# Patient Record
Sex: FEMALE | Race: WHITE | NOT HISPANIC OR LATINO
[De-identification: names, ages, dates, MRNs, and addresses within clinical notes are randomized per-mention and may not be internally consistent; named-entity substitution may affect disease eponyms.]

---

## 2017-11-28 ENCOUNTER — APPOINTMENT (OUTPATIENT)
Dept: OPHTHALMOLOGY | Facility: CLINIC | Age: 82
End: 2017-11-28

## 2018-10-15 ENCOUNTER — OUTPATIENT (OUTPATIENT)
Dept: OUTPATIENT SERVICES | Facility: HOSPITAL | Age: 83
LOS: 1 days | End: 2018-10-15
Payer: MEDICARE

## 2018-10-15 ENCOUNTER — APPOINTMENT (OUTPATIENT)
Dept: CT IMAGING | Facility: CLINIC | Age: 83
End: 2018-10-15
Payer: MEDICARE

## 2018-10-15 DIAGNOSIS — Z00.8 ENCOUNTER FOR OTHER GENERAL EXAMINATION: ICD-10-CM

## 2018-10-15 PROCEDURE — 70450 CT HEAD/BRAIN W/O DYE: CPT | Mod: 26

## 2018-10-15 PROCEDURE — 70450 CT HEAD/BRAIN W/O DYE: CPT

## 2018-10-19 ENCOUNTER — APPOINTMENT (OUTPATIENT)
Dept: NEUROLOGY | Facility: CLINIC | Age: 83
End: 2018-10-19
Payer: MEDICARE

## 2018-10-19 PROCEDURE — 95819 EEG AWAKE AND ASLEEP: CPT

## 2018-10-23 ENCOUNTER — OTHER (OUTPATIENT)
Age: 83
End: 2018-10-23

## 2018-12-17 ENCOUNTER — TRANSCRIPTION ENCOUNTER (OUTPATIENT)
Age: 83
End: 2018-12-17

## 2018-12-17 ENCOUNTER — INPATIENT (INPATIENT)
Facility: HOSPITAL | Age: 83
LOS: 10 days | Discharge: NURSING, REHAB OR LONG-TERM CARE | End: 2018-12-28
Attending: ORTHOPAEDIC SURGERY | Admitting: ORTHOPAEDIC SURGERY
Payer: MEDICARE

## 2018-12-17 VITALS
RESPIRATION RATE: 16 BRPM | SYSTOLIC BLOOD PRESSURE: 97 MMHG | HEART RATE: 109 BPM | TEMPERATURE: 98 F | DIASTOLIC BLOOD PRESSURE: 62 MMHG | OXYGEN SATURATION: 93 %

## 2018-12-17 PROCEDURE — 73552 X-RAY EXAM OF FEMUR 2/>: CPT | Mod: 26,RT

## 2018-12-17 PROCEDURE — 71045 X-RAY EXAM CHEST 1 VIEW: CPT | Mod: 26

## 2018-12-17 PROCEDURE — 73502 X-RAY EXAM HIP UNI 2-3 VIEWS: CPT | Mod: 26,RT

## 2018-12-17 RX ORDER — MORPHINE SULFATE 50 MG/1
1 CAPSULE, EXTENDED RELEASE ORAL ONCE
Qty: 0 | Refills: 0 | Status: DISCONTINUED | OUTPATIENT
Start: 2018-12-17 | End: 2018-12-17

## 2018-12-17 NOTE — ED ADULT TRIAGE NOTE - CHIEF COMPLAINT QUOTE
Pt brought in from [facility] for confirmed right hip fx. Pt with hx of dementia, had unwitnessed fall and after xray confirmed right hip fx. Pt appears uncomfortable in triage, vs as noted

## 2018-12-18 DIAGNOSIS — F03.90 UNSPECIFIED DEMENTIA WITHOUT BEHAVIORAL DISTURBANCE: ICD-10-CM

## 2018-12-18 DIAGNOSIS — I48.2 CHRONIC ATRIAL FIBRILLATION: ICD-10-CM

## 2018-12-18 DIAGNOSIS — S72.001A FRACTURE OF UNSPECIFIED PART OF NECK OF RIGHT FEMUR, INITIAL ENCOUNTER FOR CLOSED FRACTURE: ICD-10-CM

## 2018-12-18 DIAGNOSIS — I34.1 NONRHEUMATIC MITRAL (VALVE) PROLAPSE: ICD-10-CM

## 2018-12-18 DIAGNOSIS — I48.1 PERSISTENT ATRIAL FIBRILLATION: ICD-10-CM

## 2018-12-18 DIAGNOSIS — Z01.818 ENCOUNTER FOR OTHER PREPROCEDURAL EXAMINATION: ICD-10-CM

## 2018-12-18 DIAGNOSIS — S72.144A NONDISPLACED INTERTROCHANTERIC FRACTURE OF RIGHT FEMUR, INITIAL ENCOUNTER FOR CLOSED FRACTURE: ICD-10-CM

## 2018-12-18 LAB
24R-OH-CALCIDIOL SERPL-MCNC: 29.6 NG/ML — LOW (ref 30–80)
ALBUMIN SERPL ELPH-MCNC: 3.5 G/DL — SIGNIFICANT CHANGE UP (ref 3.3–5)
ALBUMIN SERPL ELPH-MCNC: 3.7 G/DL — SIGNIFICANT CHANGE UP (ref 3.3–5)
ALP SERPL-CCNC: 72 U/L — SIGNIFICANT CHANGE UP (ref 40–120)
ALT FLD-CCNC: 18 U/L — SIGNIFICANT CHANGE UP (ref 4–33)
APPEARANCE UR: CLEAR — SIGNIFICANT CHANGE UP
APTT BLD: 29.4 SEC — SIGNIFICANT CHANGE UP (ref 27.5–36.3)
AST SERPL-CCNC: 43 U/L — HIGH (ref 4–32)
BACTERIA # UR AUTO: NEGATIVE — SIGNIFICANT CHANGE UP
BASOPHILS # BLD AUTO: 0.06 K/UL — SIGNIFICANT CHANGE UP (ref 0–0.2)
BASOPHILS NFR BLD AUTO: 0.4 % — SIGNIFICANT CHANGE UP (ref 0–2)
BILIRUB SERPL-MCNC: 0.9 MG/DL — SIGNIFICANT CHANGE UP (ref 0.2–1.2)
BILIRUB UR-MCNC: NEGATIVE — SIGNIFICANT CHANGE UP
BLD GP AB SCN SERPL QL: NEGATIVE — SIGNIFICANT CHANGE UP
BLD GP AB SCN SERPL QL: NEGATIVE — SIGNIFICANT CHANGE UP
BLOOD UR QL VISUAL: NEGATIVE — SIGNIFICANT CHANGE UP
BUN SERPL-MCNC: 25 MG/DL — HIGH (ref 7–23)
BUN SERPL-MCNC: 26 MG/DL — HIGH (ref 7–23)
BUN SERPL-MCNC: 27 MG/DL — HIGH (ref 7–23)
CALCIUM SERPL-MCNC: 8.2 MG/DL — LOW (ref 8.4–10.5)
CALCIUM SERPL-MCNC: 9.2 MG/DL — SIGNIFICANT CHANGE UP (ref 8.4–10.5)
CALCIUM SERPL-MCNC: 9.2 MG/DL — SIGNIFICANT CHANGE UP (ref 8.4–10.5)
CHLORIDE SERPL-SCNC: 101 MMOL/L — SIGNIFICANT CHANGE UP (ref 98–107)
CHLORIDE SERPL-SCNC: 106 MMOL/L — SIGNIFICANT CHANGE UP (ref 98–107)
CHLORIDE SERPL-SCNC: 99 MMOL/L — SIGNIFICANT CHANGE UP (ref 98–107)
CO2 SERPL-SCNC: 25 MMOL/L — SIGNIFICANT CHANGE UP (ref 22–31)
CO2 SERPL-SCNC: 25 MMOL/L — SIGNIFICANT CHANGE UP (ref 22–31)
CO2 SERPL-SCNC: 26 MMOL/L — SIGNIFICANT CHANGE UP (ref 22–31)
COLOR SPEC: YELLOW — SIGNIFICANT CHANGE UP
CREAT SERPL-MCNC: 0.75 MG/DL — SIGNIFICANT CHANGE UP (ref 0.5–1.3)
CREAT SERPL-MCNC: 0.81 MG/DL — SIGNIFICANT CHANGE UP (ref 0.5–1.3)
CREAT SERPL-MCNC: 0.91 MG/DL — SIGNIFICANT CHANGE UP (ref 0.5–1.3)
EOSINOPHIL # BLD AUTO: 0.13 K/UL — SIGNIFICANT CHANGE UP (ref 0–0.5)
EOSINOPHIL NFR BLD AUTO: 1 % — SIGNIFICANT CHANGE UP (ref 0–6)
GLUCOSE SERPL-MCNC: 115 MG/DL — HIGH (ref 70–99)
GLUCOSE SERPL-MCNC: 136 MG/DL — HIGH (ref 70–99)
GLUCOSE SERPL-MCNC: 142 MG/DL — HIGH (ref 70–99)
GLUCOSE UR-MCNC: NEGATIVE — SIGNIFICANT CHANGE UP
HCT VFR BLD CALC: 35.1 % — SIGNIFICANT CHANGE UP (ref 34.5–45)
HCT VFR BLD CALC: 41.1 % — SIGNIFICANT CHANGE UP (ref 34.5–45)
HCT VFR BLD CALC: 42.2 % — SIGNIFICANT CHANGE UP (ref 34.5–45)
HGB BLD-MCNC: 10.9 G/DL — LOW (ref 11.5–15.5)
HGB BLD-MCNC: 13.1 G/DL — SIGNIFICANT CHANGE UP (ref 11.5–15.5)
HGB BLD-MCNC: 13.4 G/DL — SIGNIFICANT CHANGE UP (ref 11.5–15.5)
HYALINE CASTS # UR AUTO: NEGATIVE — SIGNIFICANT CHANGE UP
IMM GRANULOCYTES # BLD AUTO: 0.05 # — SIGNIFICANT CHANGE UP
IMM GRANULOCYTES NFR BLD AUTO: 0.4 % — SIGNIFICANT CHANGE UP (ref 0–1.5)
INR BLD: 1.36 — HIGH (ref 0.88–1.17)
KETONES UR-MCNC: NEGATIVE — SIGNIFICANT CHANGE UP
LEUKOCYTE ESTERASE UR-ACNC: SIGNIFICANT CHANGE UP
LYMPHOCYTES # BLD AUTO: 1.79 K/UL — SIGNIFICANT CHANGE UP (ref 1–3.3)
LYMPHOCYTES # BLD AUTO: 13.2 % — SIGNIFICANT CHANGE UP (ref 13–44)
MCHC RBC-ENTMCNC: 29.5 PG — SIGNIFICANT CHANGE UP (ref 27–34)
MCHC RBC-ENTMCNC: 29.6 PG — SIGNIFICANT CHANGE UP (ref 27–34)
MCHC RBC-ENTMCNC: 29.7 PG — SIGNIFICANT CHANGE UP (ref 27–34)
MCHC RBC-ENTMCNC: 31.1 % — LOW (ref 32–36)
MCHC RBC-ENTMCNC: 31.8 % — LOW (ref 32–36)
MCHC RBC-ENTMCNC: 31.9 % — LOW (ref 32–36)
MCV RBC AUTO: 93.2 FL — SIGNIFICANT CHANGE UP (ref 80–100)
MCV RBC AUTO: 93.4 FL — SIGNIFICANT CHANGE UP (ref 80–100)
MCV RBC AUTO: 95.1 FL — SIGNIFICANT CHANGE UP (ref 80–100)
MONOCYTES # BLD AUTO: 0.62 K/UL — SIGNIFICANT CHANGE UP (ref 0–0.9)
MONOCYTES NFR BLD AUTO: 4.6 % — SIGNIFICANT CHANGE UP (ref 2–14)
NEUTROPHILS # BLD AUTO: 10.87 K/UL — HIGH (ref 1.8–7.4)
NEUTROPHILS NFR BLD AUTO: 80.4 % — HIGH (ref 43–77)
NITRITE UR-MCNC: NEGATIVE — SIGNIFICANT CHANGE UP
NRBC # FLD: 0 — SIGNIFICANT CHANGE UP
PH UR: 6 — SIGNIFICANT CHANGE UP (ref 5–8)
PLATELET # BLD AUTO: 126 K/UL — LOW (ref 150–400)
PLATELET # BLD AUTO: 153 K/UL — SIGNIFICANT CHANGE UP (ref 150–400)
PLATELET # BLD AUTO: 159 K/UL — SIGNIFICANT CHANGE UP (ref 150–400)
PMV BLD: 12.6 FL — SIGNIFICANT CHANGE UP (ref 7–13)
PMV BLD: 12.6 FL — SIGNIFICANT CHANGE UP (ref 7–13)
PMV BLD: 13 FL — SIGNIFICANT CHANGE UP (ref 7–13)
POTASSIUM SERPL-MCNC: 4.1 MMOL/L — SIGNIFICANT CHANGE UP (ref 3.5–5.3)
POTASSIUM SERPL-MCNC: 4.2 MMOL/L — SIGNIFICANT CHANGE UP (ref 3.5–5.3)
POTASSIUM SERPL-MCNC: 4.9 MMOL/L — SIGNIFICANT CHANGE UP (ref 3.5–5.3)
POTASSIUM SERPL-SCNC: 4.1 MMOL/L — SIGNIFICANT CHANGE UP (ref 3.5–5.3)
POTASSIUM SERPL-SCNC: 4.2 MMOL/L — SIGNIFICANT CHANGE UP (ref 3.5–5.3)
POTASSIUM SERPL-SCNC: 4.9 MMOL/L — SIGNIFICANT CHANGE UP (ref 3.5–5.3)
PROT SERPL-MCNC: 7 G/DL — SIGNIFICANT CHANGE UP (ref 6–8.3)
PROT UR-MCNC: 20 — SIGNIFICANT CHANGE UP
PROTHROM AB SERPL-ACNC: 15.2 SEC — HIGH (ref 9.8–13.1)
RBC # BLD: 3.69 M/UL — LOW (ref 3.8–5.2)
RBC # BLD: 4.41 M/UL — SIGNIFICANT CHANGE UP (ref 3.8–5.2)
RBC # BLD: 4.52 M/UL — SIGNIFICANT CHANGE UP (ref 3.8–5.2)
RBC # FLD: 14.2 % — SIGNIFICANT CHANGE UP (ref 10.3–14.5)
RBC # FLD: 14.3 % — SIGNIFICANT CHANGE UP (ref 10.3–14.5)
RBC # FLD: 14.4 % — SIGNIFICANT CHANGE UP (ref 10.3–14.5)
RBC CASTS # UR COMP ASSIST: SIGNIFICANT CHANGE UP (ref 0–?)
RH IG SCN BLD-IMP: POSITIVE — SIGNIFICANT CHANGE UP
RH IG SCN BLD-IMP: POSITIVE — SIGNIFICANT CHANGE UP
SODIUM SERPL-SCNC: 140 MMOL/L — SIGNIFICANT CHANGE UP (ref 135–145)
SODIUM SERPL-SCNC: 142 MMOL/L — SIGNIFICANT CHANGE UP (ref 135–145)
SODIUM SERPL-SCNC: 145 MMOL/L — SIGNIFICANT CHANGE UP (ref 135–145)
SP GR SPEC: 1.02 — SIGNIFICANT CHANGE UP (ref 1–1.04)
SQUAMOUS # UR AUTO: SIGNIFICANT CHANGE UP
TROPONIN T, HIGH SENSITIVITY: 13 NG/L — SIGNIFICANT CHANGE UP (ref ?–14)
TROPONIN T, HIGH SENSITIVITY: 14 NG/L — SIGNIFICANT CHANGE UP (ref ?–14)
UROBILINOGEN FLD QL: NORMAL — SIGNIFICANT CHANGE UP
WBC # BLD: 13.52 K/UL — HIGH (ref 3.8–10.5)
WBC # BLD: 13.83 K/UL — HIGH (ref 3.8–10.5)
WBC # BLD: 14.38 K/UL — HIGH (ref 3.8–10.5)
WBC # FLD AUTO: 13.52 K/UL — HIGH (ref 3.8–10.5)
WBC # FLD AUTO: 13.83 K/UL — HIGH (ref 3.8–10.5)
WBC # FLD AUTO: 14.38 K/UL — HIGH (ref 3.8–10.5)
WBC UR QL: HIGH (ref 0–?)

## 2018-12-18 PROCEDURE — 99223 1ST HOSP IP/OBS HIGH 75: CPT

## 2018-12-18 PROCEDURE — 93279 PRGRMG DEV EVAL PM/LDLS PM: CPT | Mod: 26

## 2018-12-18 PROCEDURE — 93306 TTE W/DOPPLER COMPLETE: CPT | Mod: 26

## 2018-12-18 PROCEDURE — 12345: CPT | Mod: NC

## 2018-12-18 PROCEDURE — 27245 TREAT THIGH FRACTURE: CPT | Mod: RT

## 2018-12-18 PROCEDURE — 73080 X-RAY EXAM OF ELBOW: CPT | Mod: 26,RT

## 2018-12-18 PROCEDURE — 70450 CT HEAD/BRAIN W/O DYE: CPT | Mod: 26

## 2018-12-18 PROCEDURE — 72125 CT NECK SPINE W/O DYE: CPT | Mod: 26

## 2018-12-18 RX ORDER — MIRTAZAPINE 45 MG/1
1 TABLET, ORALLY DISINTEGRATING ORAL
Qty: 0 | Refills: 0 | COMMUNITY

## 2018-12-18 RX ORDER — RIVAROXABAN 15 MG-20MG
10 KIT ORAL EVERY 24 HOURS
Qty: 0 | Refills: 0 | Status: DISCONTINUED | OUTPATIENT
Start: 2018-12-19 | End: 2018-12-27

## 2018-12-18 RX ORDER — LEVETIRACETAM 250 MG/1
1 TABLET, FILM COATED ORAL
Qty: 0 | Refills: 0 | COMMUNITY

## 2018-12-18 RX ORDER — SENNA PLUS 8.6 MG/1
2 TABLET ORAL AT BEDTIME
Qty: 0 | Refills: 0 | Status: DISCONTINUED | OUTPATIENT
Start: 2018-12-18 | End: 2018-12-26

## 2018-12-18 RX ORDER — MORPHINE SULFATE 50 MG/1
1 CAPSULE, EXTENDED RELEASE ORAL
Qty: 0 | Refills: 0 | Status: DISCONTINUED | OUTPATIENT
Start: 2018-12-18 | End: 2018-12-22

## 2018-12-18 RX ORDER — CEFAZOLIN SODIUM 1 G
2000 VIAL (EA) INJECTION EVERY 8 HOURS
Qty: 0 | Refills: 0 | Status: COMPLETED | OUTPATIENT
Start: 2018-12-19 | End: 2018-12-19

## 2018-12-18 RX ORDER — OXYCODONE HYDROCHLORIDE 5 MG/1
5 TABLET ORAL EVERY 4 HOURS
Qty: 0 | Refills: 0 | Status: DISCONTINUED | OUTPATIENT
Start: 2018-12-18 | End: 2018-12-22

## 2018-12-18 RX ORDER — DILTIAZEM HCL 120 MG
120 CAPSULE, EXT RELEASE 24 HR ORAL DAILY
Qty: 0 | Refills: 0 | Status: DISCONTINUED | OUTPATIENT
Start: 2018-12-18 | End: 2018-12-19

## 2018-12-18 RX ORDER — CARVEDILOL PHOSPHATE 80 MG/1
12.5 CAPSULE, EXTENDED RELEASE ORAL EVERY 12 HOURS
Qty: 0 | Refills: 0 | Status: DISCONTINUED | OUTPATIENT
Start: 2018-12-18 | End: 2018-12-19

## 2018-12-18 RX ORDER — OXYCODONE HYDROCHLORIDE 5 MG/1
2.5 TABLET ORAL EVERY 4 HOURS
Qty: 0 | Refills: 0 | Status: DISCONTINUED | OUTPATIENT
Start: 2018-12-18 | End: 2018-12-22

## 2018-12-18 RX ORDER — ACETAMINOPHEN 500 MG
650 TABLET ORAL EVERY 8 HOURS
Qty: 0 | Refills: 0 | Status: DISCONTINUED | OUTPATIENT
Start: 2018-12-18 | End: 2018-12-25

## 2018-12-18 RX ORDER — LEVETIRACETAM 250 MG/1
500 TABLET, FILM COATED ORAL
Qty: 0 | Refills: 0 | Status: DISCONTINUED | OUTPATIENT
Start: 2018-12-18 | End: 2018-12-19

## 2018-12-18 RX ORDER — MIRTAZAPINE 45 MG/1
7.5 TABLET, ORALLY DISINTEGRATING ORAL AT BEDTIME
Qty: 0 | Refills: 0 | Status: DISCONTINUED | OUTPATIENT
Start: 2018-12-18 | End: 2018-12-19

## 2018-12-18 RX ORDER — LATANOPROST 0.05 MG/ML
1 SOLUTION/ DROPS OPHTHALMIC; TOPICAL AT BEDTIME
Qty: 0 | Refills: 0 | Status: DISCONTINUED | OUTPATIENT
Start: 2018-12-18 | End: 2018-12-28

## 2018-12-18 RX ORDER — SENNA PLUS 8.6 MG/1
2 TABLET ORAL
Qty: 0 | Refills: 0 | COMMUNITY

## 2018-12-18 RX ORDER — SODIUM CHLORIDE 9 MG/ML
1000 INJECTION INTRAMUSCULAR; INTRAVENOUS; SUBCUTANEOUS
Qty: 0 | Refills: 0 | Status: DISCONTINUED | OUTPATIENT
Start: 2018-12-18 | End: 2018-12-18

## 2018-12-18 RX ORDER — SODIUM CHLORIDE 9 MG/ML
500 INJECTION, SOLUTION INTRAVENOUS ONCE
Qty: 0 | Refills: 0 | Status: COMPLETED | OUTPATIENT
Start: 2018-12-18 | End: 2018-12-18

## 2018-12-18 RX ORDER — SODIUM CHLORIDE 9 MG/ML
1000 INJECTION INTRAMUSCULAR; INTRAVENOUS; SUBCUTANEOUS
Qty: 0 | Refills: 0 | Status: DISCONTINUED | OUTPATIENT
Start: 2018-12-18 | End: 2018-12-19

## 2018-12-18 RX ORDER — DOCUSATE SODIUM 100 MG
300 CAPSULE ORAL DAILY
Qty: 0 | Refills: 0 | Status: DISCONTINUED | OUTPATIENT
Start: 2018-12-18 | End: 2018-12-26

## 2018-12-18 RX ORDER — DOCUSATE SODIUM 100 MG
3 CAPSULE ORAL
Qty: 0 | Refills: 0 | COMMUNITY

## 2018-12-18 RX ADMIN — LEVETIRACETAM 500 MILLIGRAM(S): 250 TABLET, FILM COATED ORAL at 21:25

## 2018-12-18 RX ADMIN — SODIUM CHLORIDE 100 MILLILITER(S): 9 INJECTION INTRAMUSCULAR; INTRAVENOUS; SUBCUTANEOUS at 22:34

## 2018-12-18 RX ADMIN — LATANOPROST 1 DROP(S): 0.05 SOLUTION/ DROPS OPHTHALMIC; TOPICAL at 21:25

## 2018-12-18 RX ADMIN — MORPHINE SULFATE 1 MILLIGRAM(S): 50 CAPSULE, EXTENDED RELEASE ORAL at 09:18

## 2018-12-18 RX ADMIN — SODIUM CHLORIDE 100 MILLILITER(S): 9 INJECTION INTRAMUSCULAR; INTRAVENOUS; SUBCUTANEOUS at 20:00

## 2018-12-18 RX ADMIN — MIRTAZAPINE 7.5 MILLIGRAM(S): 45 TABLET, ORALLY DISINTEGRATING ORAL at 21:25

## 2018-12-18 RX ADMIN — OXYCODONE HYDROCHLORIDE 2.5 MILLIGRAM(S): 5 TABLET ORAL at 06:03

## 2018-12-18 RX ADMIN — Medication 120 MILLIGRAM(S): at 06:02

## 2018-12-18 RX ADMIN — Medication 300 MILLIGRAM(S): at 13:54

## 2018-12-18 RX ADMIN — LEVETIRACETAM 500 MILLIGRAM(S): 250 TABLET, FILM COATED ORAL at 06:03

## 2018-12-18 RX ADMIN — CARVEDILOL PHOSPHATE 12.5 MILLIGRAM(S): 80 CAPSULE, EXTENDED RELEASE ORAL at 06:03

## 2018-12-18 RX ADMIN — OXYCODONE HYDROCHLORIDE 2.5 MILLIGRAM(S): 5 TABLET ORAL at 09:18

## 2018-12-18 RX ADMIN — SODIUM CHLORIDE 125 MILLILITER(S): 9 INJECTION INTRAMUSCULAR; INTRAVENOUS; SUBCUTANEOUS at 06:04

## 2018-12-18 RX ADMIN — MORPHINE SULFATE 1 MILLIGRAM(S): 50 CAPSULE, EXTENDED RELEASE ORAL at 01:50

## 2018-12-18 RX ADMIN — SENNA PLUS 2 TABLET(S): 8.6 TABLET ORAL at 21:25

## 2018-12-18 RX ADMIN — SODIUM CHLORIDE 1000 MILLILITER(S): 9 INJECTION, SOLUTION INTRAVENOUS at 19:05

## 2018-12-18 NOTE — PROGRESS NOTE ADULT - SUBJECTIVE AND OBJECTIVE BOX
ORTHO ATTENDING POST OP    S/P IM FIXATION R   HIP  WBAT R   LE  restart xarelto tomorrow  venodynes  ancef 1 g x 24 h  OOB to chair in AM  rehab consult   f/u medicine  CBC in RR and AM

## 2018-12-18 NOTE — BRIEF OPERATIVE NOTE - PROCEDURE
<<-----Click on this checkbox to enter Procedure Intramedullary insertion of intertrochanteric antegrade nail into hip  12/18/2018    Active  PORTIA

## 2018-12-18 NOTE — CONSULT NOTE ADULT - SUBJECTIVE AND OBJECTIVE BOX
CHIEF COMPLAINT:Patient is a 89y old  Female who presents with a chief complaint of R hip pain (18 Dec 2018 05:43)      HISTORY OF PRESENT ILLNESS:    89 female with history as below admitted with R hip fracture  Due to altered mental status, subjective information were not able to be obtained from the patient. History was obtained, to the extent possible, from review of the chart and collateral sources of information.     PAST MEDICAL & SURGICAL HISTORY:  Varicose Veins of Lower Extremities  MVP (Mitral Valve Prolapse)  Atrial Fibrillation  Hyperlipidemia  IBS (Irritable Bowel Syndrome)  Abdominal Adhesions: removal  S/P Tonsillectomy          MEDICATIONS:  carvedilol 12.5 milliGRAM(s) Oral every 12 hours  diltiazem    milliGRAM(s) Oral daily        acetaminophen   Tablet .. 650 milliGRAM(s) Oral every 8 hours PRN  levETIRAcetam 500 milliGRAM(s) Oral two times a day  mirtazapine 7.5 milliGRAM(s) Oral at bedtime  morphine  - Injectable 1 milliGRAM(s) IV Push every 2 hours PRN  oxyCODONE    IR 2.5 milliGRAM(s) Oral every 4 hours PRN  oxyCODONE    IR 5 milliGRAM(s) Oral every 4 hours PRN    docusate sodium 300 milliGRAM(s) Oral daily  senna 2 Tablet(s) Oral at bedtime      latanoprost 0.005% Ophthalmic Solution 1 Drop(s) Both EYES at bedtime  sodium chloride 0.9%. 1000 milliLiter(s) IV Continuous <Continuous>      FAMILY HISTORY:      Non-contributory    SOCIAL HISTORY:    No tobacco, drugs or etoh    Allergies    No Known Allergies    Intolerances    	    REVIEW OF SYSTEMS:  as above  The rest of the 14 points ROS reviewed and except above they are unremarkable.        PHYSICAL EXAM:  T(C): 36.7 (12-18-18 @ 04:00), Max: 36.7 (12-18-18 @ 04:00)  HR: 77 (12-18-18 @ 04:00) (77 - 109)  BP: 128/67 (12-18-18 @ 04:00) (97/62 - 135/71)  RR: 16 (12-18-18 @ 04:00) (16 - 16)  SpO2: 96% (12-18-18 @ 04:00) (93% - 98%)  Wt(kg): --  I&O's Summary    JVP: Normal  Neck: supple  Lung: clear   CV: S1 S2 , Murmur: pos salma   Abd: soft  Ext: No edema  neuro: Awake / alert  Psych: flat affect  Skin: normal      LABS/DATA:    TELEMETRY: 	    ECG:  	afib, anterolateral st wave abn consistent with ischemia    	  CARDIAC MARKERS:                                      13.4   13.83 )-----------( 153      ( 18 Dec 2018 05:10 )             42.2     12-18    142  |  99  |  27<H>  ----------------------------<  115<H>  4.1   |  25  |  0.91    Ca    9.2      18 Dec 2018 05:10    TPro  x   /  Alb  3.7  /  TBili  x   /  DBili  x   /  AST  x   /  ALT  x   /  AlkPhos  x   12-18    proBNP:   Lipid Profile:   HgA1c:   TSH:

## 2018-12-18 NOTE — CONSULT NOTE ADULT - ASSESSMENT
89 yo F with h/o A fib on xarelto,  MVP, HLD sent from nursing home for R hip pain and R hip intertrochanteric fx.

## 2018-12-18 NOTE — PROGRESS NOTE ADULT - PROBLEM SELECTOR PLAN 1
Ortho planning for ORIF repair.  Awaiting cardiac risk stratification and management by the cardiologist.  Pain control with morphine IV PRN. On Xarelto at home - will monitor for post-op anemia.

## 2018-12-18 NOTE — ED ADULT NURSE NOTE - CHIEF COMPLAINT QUOTE
Pt brought in from [facility] for confirmed right hip fx. Pt with hx of dementia, had unwitnessed fall and after xray confirmed right hip fx. Pt appears uncomfortable in triage, vs as noted

## 2018-12-18 NOTE — CONSULT NOTE ADULT - PROBLEM SELECTOR RECOMMENDATION 2
- Unable to assess pt's exercise tolerance and there is uncertainty regarding circumstances of her fall and whether there was any LOC or possible cardiac related event as this was unwitnessed  - Monitor on telemetry as pt may have had syncopal episode and has h/o arrhythmia  - Check troponin  - Interrogate pacemaker   - Obtain echocardiogram  - Please consult cardiology to assist with cardiac clearance   - Hold xarelto before surgery - Unable to assess pt's exercise tolerance and there is uncertainty regarding circumstances of her fall and whether there was any LOC or possible cardiac related event as this was unwitnessed  - Monitor on telemetry as pt may have had syncopal episode and has h/o arrhythmia  - Check troponin  - Obtain echocardiogram  - Please consult cardiology to assist with cardiac clearance   - Hold xarelto before surgery

## 2018-12-18 NOTE — PROGRESS NOTE ADULT - PROBLEM SELECTOR PLAN 2
S/P Xarelto - stopped for the planned OR procedure.  Rate control with Cardizem, Coreg.  Tele monitor.  Appreciate Cardiology consultation.

## 2018-12-18 NOTE — ED PROVIDER NOTE - ATTENDING CONTRIBUTION TO CARE
89F h/o old L hip fx s/p ORIF, On Xarelto for a fib, , dementia, HTN, depression,  sent from Nursing home for pain in R hip today, noted to have R intertrochanteric fx. Mechanism unknown. Last seen walking yesterday Pt does not recall falling and denies pain elsewhere. On exam: awak and alert but disoriented. In pain with movement. No evidence of head neck or spine injury. + ecchymosis R elbow without deformity. + tenderness and decreased ROM R hip. otherwise no other injuries. lungs clear heart sounds rapid irregular, abdomen soft non tender. IMP: New R intertrochanteric hip fx, unknown mechanism in elderly female with dementia, on AC for a fib. Plan: analgesia, ortho at bedside, preop labs EKG CXR full Xrays L hip/femur, CT head C spine due to likely fall on xeralto 89F h/o old L hip fx s/p ORIF, On Xarelto for a fib, , dementia, HTN, depression,  sent from Nursing home for pain in R hip today, noted to have R intertrochanteric fx. Mechanism unknown. Last seen walking yesterday Pt does not recall falling and denies pain elsewhere. On exam: awake and alert but disoriented. In pain with movement. No evidence of head neck or spine injury. + ecchymosis R elbow without deformity. + tenderness and decreased ROM R hip. otherwise no other injuries. lungs clear heart sounds rapid irregular, abdomen soft non tender. IMP: New R intertrochanteric hip fx, unknown mechanism in elderly female with dementia, on AC for a fib. Plan: analgesia, ortho at bedside, preop labs EKG CXR full Xrays L hip/femur, CT head C spine due to likely fall on xeralto

## 2018-12-18 NOTE — ED PROVIDER NOTE - PHYSICAL EXAMINATION
Gen: NAD, alert and awake but not oriented (baseline per family), non-toxic //            Head: NCAT //            HEENT: EOMI, oral mucosa moist, normal conjunctiva //            Lung: CTAB, no respiratory distress, no wheezes/rhonchi/rales B/L//            CV: RRR //            Abd: soft, NTND, no guarding//            MSK: no midline spinal tenderness. pain w/ palpation of R hip. ROM testing of R hip not performed due to pain produced on exam. distal pulses intact b/l. R elbow ecchymosis. able to range R elbow w/o producing pain //            Neuro: No focal sensory or motor deficits //            Skin: Warm, well perfused //            Psych: normal affect.

## 2018-12-18 NOTE — ED PROVIDER NOTE - CARE PLAN
Principal Discharge DX:	Closed fracture of right hip, initial encounter Principal Discharge DX:	Intertrochanteric fracture of right femur

## 2018-12-18 NOTE — CONSULT NOTE ADULT - ASSESSMENT
Afib  Rate appears to be stable however monitor on tele give her hip fracture and rate tend to be labile   resume a/c post op     abnormal EKG  st wave abn consistent with ischemia  so far first of ce is neg  repeat another set     Murmur  has history of MVP but murmur appears to be at least mod AS  obtain echo    PreOp  Based on current ACC/AHA guidelines, patient history and physical exam, the patient is considered to have elevated risk  may proceed to OR for this time-sensitive surgery pending echo to eval LV/RV and valves

## 2018-12-18 NOTE — PROGRESS NOTE ADULT - SUBJECTIVE AND OBJECTIVE BOX
Orthopaedic Surgery Preop Note    Dx: R IT femur fx  Procedure: IMN  Surgeon: Lorenzo    12-18    140  |  101  |  26<H>  ----------------------------<  136<H>  4.9   |  25  |  0.81    Ca    9.2      18 Dec 2018 00:30    TPro  7.0  /  Alb  3.5  /  TBili  0.9  /  DBili  x   /  AST  43<H>  /  ALT  18  /  AlkPhos  72  12-18                          13.1   13.52 )-----------( 159      ( 18 Dec 2018 00:30 )             41.1     PT/INR - ( 18 Dec 2018 01:31 )   PT: 15.7 SEC;   INR: 1.40          PTT - ( 18 Dec 2018 01:31 )  PTT:29.3 SEC      - EKG in chart  - T/S  done  - UA pending  - CXR done    89y Female to undergo R femur IMN  - NPO pMN  - IVF when NPO  - Hold anticoagulation   - Consent in chart  - Please document medical clearance prior to OR  - Plan for OR today

## 2018-12-18 NOTE — ED PROVIDER NOTE - OBJECTIVE STATEMENT
89 y/o F w/ PPMH of a fib on xarelto, HLD, IBS presenting w/ hip pain and injury. Pt sent in from [facility] where she was found to have R hip fx. Pt presents w/ family and hx obtained from them. The day prior to ED arrival pt was up and walking at a holiday party at the facility and the family last saw her walking around 4pm. The facility called family the next morning and reported the pt was having R hip pain and was found to have R hip fracture. 89 y/o F w/ PPMH of a fib on xarelto, HLD, IBS presenting w/ hip pain and injury. Pt sent in from [facility] where she was found to have R hip fx. Pt presents w/ family and hx obtained from them. The day prior to ED arrival pt was up and walking at a holiday party at the facility and the family last saw her walking around 4pm. The facility called family the next morning and reported the pt was having R hip pain and was found to have R hip fracture on xray. Pt has no specific complaints at this time but endorses pain when R hip is examined. Unknown mechanism for hip fracture.

## 2018-12-18 NOTE — H&P ADULT - HISTORY OF PRESENT ILLNESS
89y Female presents s/p unwitnessed fall c/o severe R hip pain and inability to ambulate.  Patient denies radiation of pain. Patient denies numbness/tingling/burning in the RLE. No other bone/joint complaints. Patient is a community ambulator at baseline with assistive devices. Patient has h/o dementia and L femur IMN done at outside hospital two years ago. Patient also takes Xarelto for Afib, last dose yesterday morning.    PAST MEDICAL & SURGICAL HISTORY:  Varicose Veins of Lower Extremities  MVP (Mitral Valve Prolapse)  Atrial Fibrillation  Hyperlipidemia  IBS (Irritable Bowel Syndrome)  Abdominal Adhesions: removal  S/P Tonsillectomy    MEDICATIONS  (STANDING):  morphine  - Injectable 1 milliGRAM(s) IV Push Once  sodium chloride 0.9%. 1000 milliLiter(s) (125 mL/Hr) IV Continuous <Continuous>    MEDICATIONS  (PRN):  acetaminophen   Tablet .. 650 milliGRAM(s) Oral every 8 hours PRN Temp greater or equal to 38C (100.4F), Mild Pain (1 - 3)  morphine  - Injectable 1 milliGRAM(s) IV Push every 2 hours PRN Breakthrough  oxyCODONE    IR 2.5 milliGRAM(s) Oral every 4 hours PRN Moderate Pain (4 - 6)  oxyCODONE    IR 5 milliGRAM(s) Oral every 4 hours PRN Severe Pain (7 - 10)    Allergies    No Known Allergies    Intolerances                              13.1   13.52 )-----------( 159      ( 18 Dec 2018 00:30 )             41.1     12-18    140  |  101  |  26<H>  ----------------------------<  136<H>  4.9   |  25  |  0.81    Ca    9.2      18 Dec 2018 00:30    TPro  7.0  /  Alb  3.5  /  TBili  0.9  /  DBili  x   /  AST  43<H>  /  ALT  18  /  AlkPhos  72  12-18        T(C): 36.5 (12-17-18 @ 22:31), Max: 36.5 (12-17-18 @ 22:31)  HR: 109 (12-17-18 @ 22:31) (109 - 109)  BP: 97/62 (12-17-18 @ 22:31) (97/62 - 97/62)  RR: 16 (12-17-18 @ 22:31) (16 - 16)  SpO2: 93% (12-17-18 @ 22:31) (93% - 93%)  Wt(kg): --    PE   RLE:  Skin intact; No ecchymosis/soft tissue swelling  Compartments soft; + TTP about hip. No TTP to knee/leg/ankle/foot   ROM lmited 2/2 pain   Unable to SLR; + Log Roll/Heel Strike  Motor intact GS/TA/FHL/EHL  SILT L2-S1  DP/PT pulses 2+      Imaging:  XR demonstrating displaced R IT femur fracture    89y Female with R IT femur fracture  - Pain control  - NPO/IVF  - CBC/BMP/Coags/UA/T+S x2  - EKG/CXR  - Medical clearance prior to OR  - PPM interrogation prior to OR  - Plan for OR for ORIF

## 2018-12-18 NOTE — ED PROVIDER NOTE - PROGRESS NOTE DETAILS
[place]: Slight leukocytosis, other labs grossly wnl. Headimaging w/ no acute pathology. Admitting ortho for further care.

## 2018-12-18 NOTE — CONSULT NOTE ADULT - PROBLEM SELECTOR RECOMMENDATION 9
- Unclear if this is secondary to mechanical fall or pt had syncopal episode. Event not recorded in NH notes, ? not witnessed by NH staff  - Plan for ORIF R femur  - Pain control as per ortho surgery

## 2018-12-18 NOTE — CONSULT NOTE ADULT - SUBJECTIVE AND OBJECTIVE BOX
Patient is a 89y old  Female who presents with a chief complaint of R hip pain (18 Dec 2018 02:26)      HPI:  89 yo F with h/o A fib on xarelto,  MVP, HLD sent from nursing home for R hip pain and R hip intertrochanteric fx. Pt unable to provide history due to dementia. History obtained from the chart and her daughter Radha Butler ([phone]). Per chart, pt appeared to have pain in her R hip, an x ray was ordered and she was found to have R intertrochanteric fracture. There was no mention of fall in nursing home chart and daughter states that no one told her whether or not her mother had fallen. At baseline pt requires assistance to ambulate and does not move very far. 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 She has not had any recent witnessed falls or syncope. Per her daughter, her mental status at baseline is AAOX1 to person only and requires assistance with all ADLs. She has no h/o ischemic heart disease known to her daughter and has not had a recent stress test or cardiac cath. Last cath on record from 2008 showing normal coronaries and mildly decreased EF of 45%.       Imaging:  XRay demonstrating displaced R IT femur fracture      History limited due to: [x ] Dementia     Function: Requires assistance     Allergies    No Known Allergies    Intolerances        HOME MEDICATIONS: [x ] Reviewed  Vitamin C 500mg qD   Coreg 12.5mg BID  Mirtazapine 7.5mg qHS  Cardizem CD 120mg qD  VitD3 50,000 q month  Xalatan 0.005%1 gtt R eye  Colace 300mg qHS  Senna 2 tabs qHS  Keppra 500mg BID  Xarelto 10mg qD   Tylenol  500mg 2 tab q8prn     INPATIENT MEDICATIONS  (STANDING):  carvedilol 12.5 milliGRAM(s) Oral every 12 hours  diltiazem    milliGRAM(s) Oral daily  docusate sodium 300 milliGRAM(s) Oral daily  latanoprost 0.005% Ophthalmic Solution 1 Drop(s) Both EYES at bedtime  levETIRAcetam 500 milliGRAM(s) Oral two times a day  mirtazapine 7.5 milliGRAM(s) Oral at bedtime  senna 2 Tablet(s) Oral at bedtime  sodium chloride 0.9%. 1000 milliLiter(s) (125 mL/Hr) IV Continuous <Continuous>    MEDICATIONS  (PRN):  acetaminophen   Tablet .. 650 milliGRAM(s) Oral every 8 hours PRN Temp greater or equal to 38C (100.4F), Mild Pain (1 - 3)  morphine  - Injectable 1 milliGRAM(s) IV Push every 2 hours PRN Breakthrough  oxyCODONE    IR 2.5 milliGRAM(s) Oral every 4 hours PRN Moderate Pain (4 - 6)  oxyCODONE    IR 5 milliGRAM(s) Oral every 4 hours PRN Severe Pain (7 - 10)      PAST MEDICAL & SURGICAL HISTORY:  Varicose Veins of Lower Extremities  MVP (Mitral Valve Prolapse)  Atrial Fibrillation  Hyperlipidemia  IBS (Irritable Bowel Syndrome)  Abdominal Adhesions: removal  S/P Tonsillectomy  [x ] Reviewed     SOCIAL HISTORY:  Residence: [ ] [facility]  [x ] [facility]  [ ] Community  No h/o toxic habits     FAMILY HISTORY:  [x ] No pertinent family history in first degree relatives     REVIEW OF SYSTEMS:    Unable to obtain ROS due to dementia     Vital Signs Last 24 Hrs  T(C): 36.7 (18 Dec 2018 04:00), Max: 36.7 (18 Dec 2018 04:00)  T(F): 98.1 (18 Dec 2018 04:00), Max: 98.1 (18 Dec 2018 04:00)  HR: 77 (18 Dec 2018 04:00) (77 - 109)  BP: 128/67 (18 Dec 2018 04:00) (97/62 - 135/71)  BP(mean): --  RR: 16 (18 Dec 2018 04:00) (16 - 16)  SpO2: 96% (18 Dec 2018 04:00) (93% - 98%)    PHYSICAL EXAM:    GENERAL: NAD, well-groomed, well-developed  HEAD:  Atraumatic, Normocephalic  EYES: EOMI, PERRLA, conjunctiva and sclera clear  ENMT: Dry mucous membranes  NECK: Supple, No JVD  RESPIRATORY: Clear to auscultation bilaterally; No rales, rhonchi, wheezing, or rubs  CARDIOVASCULAR: Regular rate and rhythm; No murmurs, rubs, or gallops  GASTROINTESTINAL: Soft, Nontender, Nondistended; Bowel sounds present  GENITOURINARY: Not examined  EXTREMITIES:  2+ Peripheral Pulses, No clubbing, cyanosis, or edema  NERVOUS SYSTEM:  Alert & Oriented X3; Moving all 4 extremities; No gross sensory deficits  HEME/LYMPH: No lymphadenopathy noted  SKIN: No rashes or lesions; Incisions C/D/I    LABS:                        13.4   13.83 )-----------( 153      ( 18 Dec 2018 05:10 )             42.2     Hemoglobin: 13.4 g/dL (12-18 @ 05:10)  Hemoglobin: 13.1 g/dL (12-18 @ 00:30)    12-18    140  |  101  |  26<H>  ----------------------------<  136<H>  4.9   |  25  |  0.81    Ca    9.2      18 Dec 2018 00:30    TPro  7.0  /  Alb  3.5  /  TBili  0.9  /  DBili  x   /  AST  43<H>  /  ALT  18  /  AlkPhos  72  12-18    PT/INR - ( 18 Dec 2018 01:31 )   PT: 15.7 SEC;   INR: 1.40          PTT - ( 18 Dec 2018 01:31 )  PTT:29.3 SEC    CAPILLARY BLOOD GLUCOSE        Microbiology   RADIOLOGY & ADDITIONAL STUDIES:    EKG reviewed personally: Atrial fibrillation 108 bpm    Imaging:   Personally Reviewed:  [x ] YES               [x ] Consultant(s) Notes Reviewed  [ x] Care Discussed with Consultants/Other Providers: Patient is a 89y old  Female who presents with a chief complaint of R hip pain (18 Dec 2018 02:26)      HPI:  89 yo F with h/o A fib on xarelto,  MVP, HLD sent from nursing home for R hip pain and R hip intertrochanteric fx. Pt unable to provide history due to dementia. History obtained from the chart and her daughter Radha Butler ([phone]). Per chart, pt appeared to have pain in her R hip, an x ray was ordered and she was found to have R intertrochanteric fracture. There was no mention of fall in nursing home chart and daughter states that no one told her whether or not her mother had fallen. At baseline pt requires assistance to ambulate and does not move very far. 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 She has not had any recent witnessed falls or syncope. Per her daughter, her mental status at baseline is AAOX1 to person only and requires assistance with all ADLs. She has no h/o ischemic heart disease known to her daughter and has not had a recent stress test or cardiac cath. Last cath on record from 2008 showing normal coronaries and mildly decreased EF of 45%.       Imaging:  XRay demonstrating displaced R IT femur fracture      History limited due to: [x ] Dementia     Function: Requires assistance     Allergies    No Known Allergies    Intolerances        HOME MEDICATIONS: [x ] Reviewed  Vitamin C 500mg qD   Coreg 12.5mg BID  Mirtazapine 7.5mg qHS  Cardizem CD 120mg qD  VitD3 50,000 q month  Xalatan 0.005%1 gtt R eye  Colace 300mg qHS  Senna 2 tabs qHS  Keppra 500mg BID  Xarelto 10mg qD   Tylenol  500mg 2 tab q8prn     INPATIENT MEDICATIONS  (STANDING):  carvedilol 12.5 milliGRAM(s) Oral every 12 hours  diltiazem    milliGRAM(s) Oral daily  docusate sodium 300 milliGRAM(s) Oral daily  latanoprost 0.005% Ophthalmic Solution 1 Drop(s) Both EYES at bedtime  levETIRAcetam 500 milliGRAM(s) Oral two times a day  mirtazapine 7.5 milliGRAM(s) Oral at bedtime  senna 2 Tablet(s) Oral at bedtime  sodium chloride 0.9%. 1000 milliLiter(s) (125 mL/Hr) IV Continuous <Continuous>    MEDICATIONS  (PRN):  acetaminophen   Tablet .. 650 milliGRAM(s) Oral every 8 hours PRN Temp greater or equal to 38C (100.4F), Mild Pain (1 - 3)  morphine  - Injectable 1 milliGRAM(s) IV Push every 2 hours PRN Breakthrough  oxyCODONE    IR 2.5 milliGRAM(s) Oral every 4 hours PRN Moderate Pain (4 - 6)  oxyCODONE    IR 5 milliGRAM(s) Oral every 4 hours PRN Severe Pain (7 - 10)      PAST MEDICAL & SURGICAL HISTORY:  Varicose Veins of Lower Extremities  MVP (Mitral Valve Prolapse)  Atrial Fibrillation  Hyperlipidemia  IBS (Irritable Bowel Syndrome)  Abdominal Adhesions: removal  S/P Tonsillectomy  [x ] Reviewed     SOCIAL HISTORY:  Residence: [ ] [facility]  [x ] [facility]  [ ] Community  No h/o toxic habits     FAMILY HISTORY:  [x ] No pertinent family history in first degree relatives     REVIEW OF SYSTEMS:    Unable to obtain ROS due to dementia     Vital Signs Last 24 Hrs  T(C): 36.7 (18 Dec 2018 04:00), Max: 36.7 (18 Dec 2018 04:00)  T(F): 98.1 (18 Dec 2018 04:00), Max: 98.1 (18 Dec 2018 04:00)  HR: 77 (18 Dec 2018 04:00) (77 - 109)  BP: 128/67 (18 Dec 2018 04:00) (97/62 - 135/71)  BP(mean): --  RR: 16 (18 Dec 2018 04:00) (16 - 16)  SpO2: 96% (18 Dec 2018 04:00) (93% - 98%)    PHYSICAL EXAM:    GENERAL: NAD, well-groomed, well-developed  HEAD:  Atraumatic, Normocephalic  EYES: EOMI, PERRLA, conjunctiva and sclera clear  ENMT: Dry mucous membranes  NECK: Supple, No JVD  RESPIRATORY: Clear to auscultation bilaterally; No rales, rhonchi, wheezing, or rubs  CARDIOVASCULAR: Regular rate and rhythm; No murmurs, rubs, or gallops  GASTROINTESTINAL: Soft, Nontender, Nondistended; Bowel sounds present  GENITOURINARY: Not examined  EXTREMITIES:  2+ Peripheral Pulses, No clubbing, cyanosis, or edema  NERVOUS SYSTEM:  Alert & Oriented X1 to person only; Follows simple commands, Moving all 4 extremities; No gross sensory deficits  HEME/LYMPH: No lymphadenopathy noted  SKIN: No rashes or lesions; Incisions C/D/I  MSK: Tenderness with palpation of R hip     LABS:                        13.4   13.83 )-----------( 153      ( 18 Dec 2018 05:10 )             42.2     Hemoglobin: 13.4 g/dL (12-18 @ 05:10)  Hemoglobin: 13.1 g/dL (12-18 @ 00:30)    12-18    140  |  101  |  26<H>  ----------------------------<  136<H>  4.9   |  25  |  0.81    Ca    9.2      18 Dec 2018 00:30    TPro  7.0  /  Alb  3.5  /  TBili  0.9  /  DBili  x   /  AST  43<H>  /  ALT  18  /  AlkPhos  72  12-18    PT/INR - ( 18 Dec 2018 01:31 )   PT: 15.7 SEC;   INR: 1.40          PTT - ( 18 Dec 2018 01:31 )  PTT:29.3 SEC    CAPILLARY BLOOD GLUCOSE        Microbiology   RADIOLOGY & ADDITIONAL STUDIES:    EKG reviewed personally: Atrial fibrillation 108 bpm    Imaging:   Personally Reviewed:  [x ] YES               [x ] Consultant(s) Notes Reviewed  [ x] Care Discussed with Consultants/Other Providers:

## 2018-12-18 NOTE — PROGRESS NOTE ADULT - SUBJECTIVE AND OBJECTIVE BOX
CC: F/U for Rt femur fx    SUBJECTIVE / OVERNIGHT EVENTS:  Patient has dementia, unable to make her needs known at this time.  No new issues with F/C, N/V, CP, SOB, Cough, lightheadedness, dizziness, abdominal pain, diarrhea, dysuria.    MEDICATIONS  (STANDING):  carvedilol 12.5 milliGRAM(s) Oral every 12 hours  diltiazem    milliGRAM(s) Oral daily  docusate sodium 300 milliGRAM(s) Oral daily  latanoprost 0.005% Ophthalmic Solution 1 Drop(s) Both EYES at bedtime  levETIRAcetam 500 milliGRAM(s) Oral two times a day  mirtazapine 7.5 milliGRAM(s) Oral at bedtime  senna 2 Tablet(s) Oral at bedtime  sodium chloride 0.9%. 1000 milliLiter(s) (125 mL/Hr) IV Continuous <Continuous>    MEDICATIONS  (PRN):  acetaminophen   Tablet .. 650 milliGRAM(s) Oral every 8 hours PRN Temp greater or equal to 38C (100.4F), Mild Pain (1 - 3)  morphine  - Injectable 1 milliGRAM(s) IV Push every 2 hours PRN Breakthrough  oxyCODONE    IR 2.5 milliGRAM(s) Oral every 4 hours PRN Moderate Pain (4 - 6)  oxyCODONE    IR 5 milliGRAM(s) Oral every 4 hours PRN Severe Pain (7 - 10)      Vital Signs Last 24 Hrs  T(C): 36.7 (18 Dec 2018 04:00), Max: 36.7 (18 Dec 2018 04:00)  T(F): 98.1 (18 Dec 2018 04:00), Max: 98.1 (18 Dec 2018 04:00)  HR: 66 (18 Dec 2018 08:55) (66 - 109)  BP: 136/79 (18 Dec 2018 08:55) (97/62 - 136/79)  BP(mean): --  RR: 18 (18 Dec 2018 08:55) (16 - 18)  SpO2: 94% (18 Dec 2018 08:55) (93% - 98%)  CAPILLARY BLOOD GLUCOSE        I&O's Summary      PHYSICAL EXAM:  GENERAL: NAD, well-developed  HEAD:  Atraumatic, Normocephalic  EYES: EOMI, PERRLA, conjunctiva and sclera clear  NECK: Supple, No JVD  CHEST/LUNG: Clear to auscultation bilaterally; No wheeze  HEART: AFib, JESSIE; No rubs, or gallops  ABDOMEN: Soft, Nontender, Nondistended; Bowel sounds present  BACK: Non tender, ROM intact.  EXTREMITIES:  2+ Peripheral Pulses, No clubbing, cyanosis. RLE limited ROM due to pain.  PSYCH: Calm  NEUROLOGY: AAOx0, non-focal neurological exam  SKIN: Intact    LABS:                        13.4   13.83 )-----------( 153      ( 18 Dec 2018 05:10 )             42.2         142  |  99  |  27<H>  ----------------------------<  115<H>  4.1   |  25  |  0.91    Ca    9.2      18 Dec 2018 05:10    TPro  x   /  Alb  3.7  /  TBili  x   /  DBili  x   /  AST  x   /  ALT  x   /  AlkPhos  x   12-18    PT/INR - ( 18 Dec 2018 05:10 )   PT: 15.2 SEC;   INR: 1.36          PTT - ( 18 Dec 2018 05:10 )  PTT:29.4 SEC      Urinalysis Basic - ( 18 Dec 2018 05:40 )    Color: YELLOW / Appearance: CLEAR / S.022 / pH: 6.0  Gluc: NEGATIVE / Ketone: NEGATIVE  / Bili: NEGATIVE / Urobili: NORMAL   Blood: NEGATIVE / Protein: 20 / Nitrite: NEGATIVE   Leuk Esterase: SMALL / RBC: 3-5 / WBC 6-10   Sq Epi: OCC / Non Sq Epi: x / Bacteria: NEGATIVE        RADIOLOGY & ADDITIONAL TESTS:    Imaging Personally Reviewed:    Care Discussed with Consultants/Other Providers:    Care Discussed with primary team.

## 2018-12-18 NOTE — PATIENT PROFILE ADULT - DOES PATIENT HAVE ADVANCE DIRECTIVE
unable to obtain information. pt A&O x1 and no family member present. no advanced directive in chart/no

## 2018-12-18 NOTE — CHART NOTE - NSCHARTNOTEFT_GEN_A_CORE
ELECTROPHYSIOLOGY    Device Interrogation Performed                                  Date/Time:     12/18/2018  :             [employer]                           Model:      Essentia single chamber PPM                          Mode:                                                       Rate:       Atrial Lead:  P wave amplitude:                          mv            Impedance                                       Ohms  Threshold                                          V @                ms      Ventricular Lead: RV  R wave amplitude            12               mv  Impedance                   482                    Ohms  Threshold                  0.5                       V @       0.5          ms                                          Battery Status:                     Good                Underlying Rhythm:     Atrial fibrillation with ventricular rates 100-110 bpm    Events/Observation:   Multiple episodes of atrial fibrillation with RVR (150-160).  Currently better rate controlled.     Impression/Plan:  Normal PPM   Normal sensing and pacing via iterative testing. Good battery status. Excellent threshold capture.  No reprogramming. ELECTROPHYSIOLOGY    Device Interrogation Performed                                  Date/Time:     12/18/2018  :             [employer]                           Model:      Essentia single chamber PPM                          Mode:              VVI                                         Rate:  ^)     Atrial Lead:  P wave amplitude:                          mv            Impedance                                       Ohms  Threshold                                          V @                ms      Ventricular Lead: RV  R wave amplitude            12               mv  Impedance                   482                    Ohms  Threshold                  0.5                       V @       0.5          ms      Battery Status:                     Good                Underlying Rhythm:     Atrial fibrillation with ventricular rates 100-110 bpm    Events/Observation:   Multiple episodes of atrial fibrillation with RVR (150-160).  Currently better rate controlled.     Impression/Plan:  Normal PPM   Normal sensing and pacing via iterative testing. Good battery status. Excellent threshold capture.  No reprogramming.

## 2018-12-18 NOTE — PROGRESS NOTE ADULT - SUBJECTIVE AND OBJECTIVE BOX
Orthopaedic Surgery POC    Subjective:   Patient seen and examined  No acute events postop  Pain well controlled    Objective:  T(C): 36.7 (18 @ 20:15), Max: 36.7 (18 @ 04:00)  HR: 90 (18 @ 21:00) (66 - 130)  BP: 94/56 (18 @ 21:00) (94/56 - 136/79)  RR: 17 (18 @ 21:00) (14 - 26)  SpO2: 95% (18 @ 21:10) (91% - 100%)  Wt(kg): --     @ 07:01  -   @ 21:32  --------------------------------------------------------  IN: 700 mL / OUT: 0 mL / NET: 700 mL        PE    NAD  RLE LLE:   dressing C/D/I, mild ss strikethrough  unable to cooperate with exam d/t baseline mental status  WWP, BCR                          10.9   14.38 )-----------( 126      ( 18 Dec 2018 19:15 )             35.1         145  |  106  |  25<H>  ----------------------------<  142<H>  4.2   |  26  |  0.75    Ca    8.2<L>      18 Dec 2018 19:15    TPro  x   /  Alb  3.7  /  TBili  x   /  DBili  x   /  AST  x   /  ALT  x   /  AlkPhos  x       PT/INR - ( 18 Dec 2018 05:10 )   PT: 15.2 SEC;   INR: 1.36          PTT - ( 18 Dec 2018 05:10 )  PTT:29.4 SEC  Urinalysis Basic - ( 18 Dec 2018 05:40 )    Color: YELLOW / Appearance: CLEAR / S.022 / pH: 6.0  Gluc: NEGATIVE / Ketone: NEGATIVE  / Bili: NEGATIVE / Urobili: NORMAL   Blood: NEGATIVE / Protein: 20 / Nitrite: NEGATIVE   Leuk Esterase: SMALL / RBC: 3-5 / WBC 6-10   Sq Epi: OCC / Non Sq Epi: x / Bacteria: NEGATIVE        89y Female s/p R femur IMN  - Pain control  - WBAT  - PT/OT/OOB  - DVT ppx - Xarelto

## 2018-12-18 NOTE — ED PROVIDER NOTE - PMH
Atrial Fibrillation    Hyperlipidemia    IBS (Irritable Bowel Syndrome)    MVP (Mitral Valve Prolapse)    Varicose Veins of Lower Extremities

## 2018-12-18 NOTE — CONSULT NOTE ADULT - PROBLEM SELECTOR RECOMMENDATION 3
- CHADS VASC of 3  - Currently rate controlled. Continue coreg and cardizem  - f/u cardiology consult   - Hold xarelto

## 2018-12-18 NOTE — ED PROVIDER NOTE - MEDICAL DECISION MAKING DETAILS
89 y/o F w/ reported hip fracture on outside imaging. Ortho saw pt while in waiting room and expedited imaging here. Confirmed hip fracture. Pt to get labs and EKG done in ED. Additionally given the unknown hx surrounding cause of injury, will get CTH and CT c-spine to eval for injury from presumed unwitnessed fall. Analgesia. To be admitted to ortho.

## 2018-12-18 NOTE — ED ADULT NURSE REASSESSMENT NOTE - NS ED NURSE REASSESS COMMENT FT1
Pt received from night RN Mary. pt sleeping at this time. VS as noted. Will continue to monitor. Pt received from night JENA Hernandez. Pt admitted to Ortho service for R hip fracture s/p unwitnessed fall yesterday, and requiring surgery. pt sleeping at this time. VS as noted. Cardiac monitor in place. Pt to remain NPO for procedure. Pt denies any increase in pain at this time. Slight swelling in affected leg, but no ecchymosis noted at this time. AM hygiene care provided. Will continue to monitor. Pt received from night RN Mary. Pt admitted to Ortho service for R hip fracture s/p unwitnessed fall yesterday, and requiring surgery. pt sleeping at this time. VS as noted. Cardiac monitor in place. Pt to remain NPO for procedure. Pt appears comfortable at this time. Slight swelling in affected leg, but no ecchymosis noted at this time. AM hygiene care provided. Will continue to monitor. Pt received from night RN Mary. Pt admitted to Ortho service for R hip fracture s/p unwitnessed fall yesterday, and requiring surgery. pt sleeping and appears comfortable at this time. VS as noted. Cardiac monitor in place. Pt to remain NPO for procedure.  Slight swelling in affected leg, but no ecchymosis noted at this time. AM hygiene care provided. Will continue to monitor.

## 2018-12-19 LAB
APTT BLD: 24.9 SEC — LOW (ref 27.5–36.3)
BUN SERPL-MCNC: 23 MG/DL — SIGNIFICANT CHANGE UP (ref 7–23)
CALCIUM SERPL-MCNC: 8.2 MG/DL — LOW (ref 8.4–10.5)
CHLORIDE SERPL-SCNC: 108 MMOL/L — HIGH (ref 98–107)
CO2 SERPL-SCNC: 24 MMOL/L — SIGNIFICANT CHANGE UP (ref 22–31)
CREAT SERPL-MCNC: 0.72 MG/DL — SIGNIFICANT CHANGE UP (ref 0.5–1.3)
GLUCOSE SERPL-MCNC: 130 MG/DL — HIGH (ref 70–99)
HCT VFR BLD CALC: 31.9 % — LOW (ref 34.5–45)
HGB BLD-MCNC: 10.2 G/DL — LOW (ref 11.5–15.5)
INR BLD: 1.03 — SIGNIFICANT CHANGE UP (ref 0.88–1.17)
MCHC RBC-ENTMCNC: 29.8 PG — SIGNIFICANT CHANGE UP (ref 27–34)
MCHC RBC-ENTMCNC: 32 % — SIGNIFICANT CHANGE UP (ref 32–36)
MCV RBC AUTO: 93.3 FL — SIGNIFICANT CHANGE UP (ref 80–100)
NRBC # FLD: 0.03 — SIGNIFICANT CHANGE UP
PLATELET # BLD AUTO: 117 K/UL — LOW (ref 150–400)
PMV BLD: 12.6 FL — SIGNIFICANT CHANGE UP (ref 7–13)
POTASSIUM SERPL-MCNC: 3.8 MMOL/L — SIGNIFICANT CHANGE UP (ref 3.5–5.3)
POTASSIUM SERPL-SCNC: 3.8 MMOL/L — SIGNIFICANT CHANGE UP (ref 3.5–5.3)
PROTHROM AB SERPL-ACNC: 11.8 SEC — SIGNIFICANT CHANGE UP (ref 9.8–13.1)
RBC # BLD: 3.42 M/UL — LOW (ref 3.8–5.2)
RBC # FLD: 14.6 % — HIGH (ref 10.3–14.5)
SODIUM SERPL-SCNC: 145 MMOL/L — SIGNIFICANT CHANGE UP (ref 135–145)
WBC # BLD: 10.53 K/UL — HIGH (ref 3.8–10.5)
WBC # FLD AUTO: 10.53 K/UL — HIGH (ref 3.8–10.5)

## 2018-12-19 PROCEDURE — 99233 SBSQ HOSP IP/OBS HIGH 50: CPT

## 2018-12-19 PROCEDURE — 99222 1ST HOSP IP/OBS MODERATE 55: CPT | Mod: GC

## 2018-12-19 RX ORDER — DILTIAZEM HCL 120 MG
11 CAPSULE, EXT RELEASE 24 HR ORAL
Qty: 125 | Refills: 0 | Status: DISCONTINUED | OUTPATIENT
Start: 2018-12-19 | End: 2018-12-20

## 2018-12-19 RX ADMIN — SODIUM CHLORIDE 100 MILLILITER(S): 9 INJECTION INTRAMUSCULAR; INTRAVENOUS; SUBCUTANEOUS at 07:21

## 2018-12-19 RX ADMIN — Medication 100 MILLIGRAM(S): at 00:11

## 2018-12-19 RX ADMIN — OXYCODONE HYDROCHLORIDE 5 MILLIGRAM(S): 5 TABLET ORAL at 11:20

## 2018-12-19 RX ADMIN — LATANOPROST 1 DROP(S): 0.05 SOLUTION/ DROPS OPHTHALMIC; TOPICAL at 22:22

## 2018-12-19 RX ADMIN — Medication 100 MILLIGRAM(S): at 08:01

## 2018-12-19 RX ADMIN — Medication 11 MG/HR: at 13:47

## 2018-12-19 RX ADMIN — Medication 10 MG/HR: at 08:44

## 2018-12-19 RX ADMIN — RIVAROXABAN 10 MILLIGRAM(S): KIT at 10:25

## 2018-12-19 RX ADMIN — OXYCODONE HYDROCHLORIDE 5 MILLIGRAM(S): 5 TABLET ORAL at 10:25

## 2018-12-19 RX ADMIN — Medication 300 MILLIGRAM(S): at 10:25

## 2018-12-19 NOTE — OCCUPATIONAL THERAPY INITIAL EVALUATION ADULT - RANGE OF MOTION EXAMINATION, UPPER EXTREMITY
except both shoulders 0-100 flex active assisted ROM/bilateral UE Active Assistive ROM was WFL  (within functional limits)

## 2018-12-19 NOTE — PROGRESS NOTE ADULT - ASSESSMENT
89F Afib on xarelto s/p PPM, Dementia presents with unwitnessed fall - suspect syncope, Rt femur fx - s/p ORIF on 12/18 complicated by post-op leukocytosis, anemia, Afib with RVR, acute encephalopathy.

## 2018-12-19 NOTE — PROGRESS NOTE ADULT - SUBJECTIVE AND OBJECTIVE BOX
Subjective: Patient seen and examined. No new events except as noted.     SUBJECTIVE/ROS:  Due to altered mental status, subjective information were not able to be obtained from the patient. History was obtained, to the extent possible, from review of the chart and collateral sources of information.        MEDICATIONS:  MEDICATIONS  (STANDING):  ceFAZolin   IVPB 2000 milliGRAM(s) IV Intermittent every 8 hours  diltiazem Infusion 10 mG/Hr (10 mL/Hr) IV Continuous <Continuous>  docusate sodium 300 milliGRAM(s) Oral daily  latanoprost 0.005% Ophthalmic Solution 1 Drop(s) Both EYES at bedtime  levETIRAcetam 500 milliGRAM(s) Oral two times a day  mirtazapine 7.5 milliGRAM(s) Oral at bedtime  rivaroxaban 10 milliGRAM(s) Oral every 24 hours  senna 2 Tablet(s) Oral at bedtime  sodium chloride 0.9%. 1000 milliLiter(s) (100 mL/Hr) IV Continuous <Continuous>      PHYSICAL EXAM:  T(C): 37.1 (12-19-18 @ 05:26), Max: 37.1 (12-19-18 @ 05:26)  HR: 135 (12-19-18 @ 06:48) (66 - 135)  BP: 110/49 (12-19-18 @ 06:48) (94/56 - 136/81)  RR: 19 (12-19-18 @ 05:26) (14 - 26)  SpO2: 94% (12-19-18 @ 05:26) (91% - 100%)  Wt(kg): --  I&O's Summary    18 Dec 2018 07:01  -  19 Dec 2018 07:00  --------------------------------------------------------  IN: 1600 mL / OUT: 0 mL / NET: 1600 mL        JVP: Normal  Neck: supple  Lung: clear   CV: S1 S2 , Murmur:  Abd: soft  Ext: No edema  neuro: lethargic   Psych: flat affect  Skin: normal      LABS/DATA:    CARDIAC MARKERS:                                10.9   14.38 )-----------( 126      ( 18 Dec 2018 19:15 )             35.1     12-18    145  |  106  |  25<H>  ----------------------------<  142<H>  4.2   |  26  |  0.75    Ca    8.2<L>      18 Dec 2018 19:15    TPro  x   /  Alb  3.7  /  TBili  x   /  DBili  x   /  AST  x   /  ALT  x   /  AlkPhos  x   12-18    proBNP:   Lipid Profile:   HgA1c:   TSH:     TELE:  EKG:    < from: Transthoracic Echocardiogram (12.18.18 @ 08:41) >  CONCLUSIONS:  1. Mitral valve  leaflets not well visualized, appears  calcified. Severe eccentric posteriorly directed mitral  regurgitation.  2. Calcified trileaflet aortic valve with normal opening.  Mild aortic regurgitation.  3. Severely dilated left atrium.  LA volume index = 57  cc/m2.  4. Normal left ventricular systolic function. No segmental  wall motion abnormalities.  5. Normal right ventricular size and function.  6. Normal tricuspid valve. Moderate tricuspid  regurgitation.  7. Estimated pulmonary artery systolic pressure equals 55  mm Hg, assuming right atrial pressure equals 10  mm Hg,  consistent with moderate pulmonary hypertension.    < end of copied text >

## 2018-12-19 NOTE — PROGRESS NOTE ADULT - SUBJECTIVE AND OBJECTIVE BOX
CC: F/U for tachycardia and poor PO intake.    SUBJECTIVE / OVERNIGHT EVENTS:  Patient unable to provide information due to poor mental status.  Unable to tolerate PO med intake.  No overnight episode of F/C, N/V, CP, SOB, Cough, lightheadedness, dizziness, abdominal pain, diarrhea, dysuria.    MEDICATIONS  (STANDING):  diltiazem Infusion 11 mG/Hr (11 mL/Hr) IV Continuous <Continuous>  docusate sodium 300 milliGRAM(s) Oral daily  latanoprost 0.005% Ophthalmic Solution 1 Drop(s) Both EYES at bedtime  levETIRAcetam 500 milliGRAM(s) Oral two times a day  mirtazapine 7.5 milliGRAM(s) Oral at bedtime  rivaroxaban 10 milliGRAM(s) Oral every 24 hours  senna 2 Tablet(s) Oral at bedtime    MEDICATIONS  (PRN):  acetaminophen   Tablet .. 650 milliGRAM(s) Oral every 8 hours PRN Temp greater or equal to 38C (100.4F), Mild Pain (1 - 3)  morphine  - Injectable 1 milliGRAM(s) IV Push every 2 hours PRN Breakthrough  oxyCODONE    IR 2.5 milliGRAM(s) Oral every 4 hours PRN Moderate Pain (4 - 6)  oxyCODONE    IR 5 milliGRAM(s) Oral every 4 hours PRN Severe Pain (7 - 10)      Vital Signs Last 24 Hrs  T(C): 36.9 (19 Dec 2018 07:27), Max: 37.1 (19 Dec 2018 05:26)  T(F): 98.4 (19 Dec 2018 07:27), Max: 98.8 (19 Dec 2018 05:26)  HR: 119 (19 Dec 2018 12:55) (86 - 135)  BP: 100/51 (19 Dec 2018 12:55) (94/56 - 136/81)  BP(mean): 74 (18 Dec 2018 22:00) (60 - 86)  RR: 20 (19 Dec 2018 07:27) (14 - 26)  SpO2: 96% (19 Dec 2018 07:27) (91% - 100%)  CAPILLARY BLOOD GLUCOSE        I&O's Summary    18 Dec 2018 07:01  -  19 Dec 2018 07:00  --------------------------------------------------------  IN: 1600 mL / OUT: 0 mL / NET: 1600 mL        PHYSICAL EXAM:  GENERAL: NAD  HEAD:  Atraumatic, Normocephalic  EYES: EOMI, PERRLA, conjunctiva and sclera clear  NECK: Supple, No JVD  CHEST/LUNG: Clear to auscultation bilaterally; No wheeze  HEART: AFib, JESSIE; No rubs, or gallops  ABDOMEN: Soft, Nontender, Nondistended; Bowel sounds present  BACK: Non tender, ROM intact.  EXTREMITIES:  2+ Peripheral Pulses, No clubbing, cyanosis. RLE limited ROM due to pain.  PSYCH: Calm  NEUROLOGY: Obtunded, AAOx0, non-focal neurological exam  SKIN: Intact    LABS:                        10.2   10.53 )-----------( 117      ( 19 Dec 2018 07:02 )             31.9     12-19    145  |  108<H>  |  23  ----------------------------<  130<H>  3.8   |  24  |  0.72    Ca    8.2<L>      19 Dec 2018 07:02    TPro  x   /  Alb  3.7  /  TBili  x   /  DBili  x   /  AST  x   /  ALT  x   /  AlkPhos  x   12-18    PT/INR - ( 19 Dec 2018 07:02 )   PT: 11.8 SEC;   INR: 1.03          PTT - ( 19 Dec 2018 07:02 )  PTT:24.9 SEC      Urinalysis Basic - ( 18 Dec 2018 05:40 )    Color: YELLOW / Appearance: CLEAR / S.022 / pH: 6.0  Gluc: NEGATIVE / Ketone: NEGATIVE  / Bili: NEGATIVE / Urobili: NORMAL   Blood: NEGATIVE / Protein: 20 / Nitrite: NEGATIVE   Leuk Esterase: SMALL / RBC: 3-5 / WBC 6-10   Sq Epi: OCC / Non Sq Epi: x / Bacteria: NEGATIVE        RADIOLOGY & ADDITIONAL TESTS:    Imaging Personally Reviewed:    Care Discussed with Consultants/Other Providers:    Care Discussed with primary team.

## 2018-12-19 NOTE — PROGRESS NOTE ADULT - SUBJECTIVE AND OBJECTIVE BOX
89y Female s/p   RIGHT HIP IM NAIL    T(C): 36.9 (12-19-18 @ 07:27), Max: 37.1 (12-19-18 @ 05:26)  HR: 130 (12-19-18 @ 07:27) (86 - 135)  BP: 100/59 (12-19-18 @ 07:27) (94/56 - 136/81)  RR: 20 (12-19-18 @ 07:27) (14 - 26)  SpO2: 96% (12-19-18 @ 07:27) (91% - 100%)  Wt(kg): --    Pt seen, doing well, no anesthesia complications or complaints noted or reported.   No Nausea  Pain well controlled

## 2018-12-19 NOTE — OCCUPATIONAL THERAPY INITIAL EVALUATION ADULT - PERTINENT HX OF CURRENT PROBLEM, REHAB EVAL
89y year old female admitted with s/p fall and resulting IT fracture. Pt is  s/p Intramedullary insertion of intertrochanteric antegrade nail into hip,

## 2018-12-19 NOTE — CONSULT NOTE ADULT - ASSESSMENT
s/p hip fx ORIF  PT/OT  pain- tylenol 975 TID, lidoderm, ice- try to minimize oxycodone   Dispo- back to [facility] for [facility]

## 2018-12-19 NOTE — CONSULT NOTE ADULT - SUBJECTIVE AND OBJECTIVE BOX
HPI:  89y Female presents s/p unwitnessed fall c/o severe R hip pain and inability to ambulate.  Patient denies radiation of pain. Patient denies numbness/tingling/burning in the RLE. No other bone/joint complaints. Patient is a community ambulator at baseline with assistive devices. Patient has h/o dementia and L femur IMN done at outside hospital two years ago. Patient also takes Xarelto for Afib, last dose yesterday morning.  x-ray   Acute slightly comminuted and offset proximal right femoral   intertrochanteric fracture.        s/p Intramedullary insertion of intertrochanteric antegrade nail into hip  Severe MR, MVP  pt at high risk of acute CHF    REVIEW OF SYSTEMS: No chest pain, shortness of breath, nausea, vomiting or diarhea.      PAST MEDICAL & SURGICAL HISTORY  Varicose Veins of Lower Extremities  MVP (Mitral Valve Prolapse)  Atrial Fibrillation  Hyperlipidemia  IBS (Irritable Bowel Syndrome)  Abdominal Adhesions  S/P Tonsillectomy      SOCIAL HISTORY  Smoking - Denied, EtOH - Denied, Drugs - Denied    FUNCTIONAL HISTORY:   Lives   Independent    CURRENT FUNCTIONAL STATUS:      FAMILY HISTORY       RECENT LABS/IMAGING  CBC Full  -  ( 19 Dec 2018 07:02 )  WBC Count : 10.53 K/uL  Hemoglobin : 10.2 g/dL  Hematocrit : 31.9 %  Platelet Count - Automated : 117 K/uL  Mean Cell Volume : 93.3 fL  Mean Cell Hemoglobin : 29.8 pg  Mean Cell Hemoglobin Concentration : 32.0 %  Auto Neutrophil # : x  Auto Lymphocyte # : x  Auto Monocyte # : x  Auto Eosinophil # : x  Auto Basophil # : x  Auto Neutrophil % : x  Auto Lymphocyte % : x  Auto Monocyte % : x  Auto Eosinophil % : x  Auto Basophil % : x    12-    145  |  108<H>  |  23  ----------------------------<  130<H>  3.8   |  24  |  0.72    Ca    8.2<L>      19 Dec 2018 07:02    TPro  x   /  Alb  3.7  /  TBili  x   /  DBili  x   /  AST  x   /  ALT  x   /  AlkPhos  x   12-18    Urinalysis Basic - ( 18 Dec 2018 05:40 )    Color: YELLOW / Appearance: CLEAR / S.022 / pH: 6.0  Gluc: NEGATIVE / Ketone: NEGATIVE  / Bili: NEGATIVE / Urobili: NORMAL   Blood: NEGATIVE / Protein: 20 / Nitrite: NEGATIVE   Leuk Esterase: SMALL / RBC: 3-5 / WBC 6-10   Sq Epi: OCC / Non Sq Epi: x / Bacteria: NEGATIVE        VITALS  T(C): 36.9 (18 @ 07:27), Max: 37.1 (18 @ 05:26)  HR: 130 (18 @ 07:27) (86 - 135)  BP: 100/59 (18 @ 07:27) (94/56 - 136/81)  RR: 20 (18 @ 07:27) (14 - 26)  SpO2: 96% (18 @ 07:27) (91% - 100%)  Wt(kg): --    ALLERGIES  No Known Allergies      MEDICATIONS   acetaminophen   Tablet .. 650 milliGRAM(s) Oral every 8 hours PRN  diltiazem Infusion 10 mG/Hr IV Continuous <Continuous>  docusate sodium 300 milliGRAM(s) Oral daily  latanoprost 0.005% Ophthalmic Solution 1 Drop(s) Both EYES at bedtime  levETIRAcetam 500 milliGRAM(s) Oral two times a day  mirtazapine 7.5 milliGRAM(s) Oral at bedtime  morphine  - Injectable 1 milliGRAM(s) IV Push every 2 hours PRN  oxyCODONE    IR 2.5 milliGRAM(s) Oral every 4 hours PRN  oxyCODONE    IR 5 milliGRAM(s) Oral every 4 hours PRN  rivaroxaban 10 milliGRAM(s) Oral every 24 hours  senna 2 Tablet(s) Oral at bedtime      ----------------------------------------------------------------------------------------  PHYSICAL EXAM  Constitutional - NAD, Comfortable  HEENT - NCAT, EOMI  Neck - Supple, No limited ROM  Chest - CTA bilaterally, No wheeze, No rhonchi, No crackles  Cardiovascular - RRR, S1S2, No murmurs  Abdomen - BS+, Soft, NTND  Extremities - No C/C/E, No calf tenderness   Neurologic Exam -                    Cognitive - Awake, Alert, AAO to self, place, date, year, situation     Communication - Fluent, No dysarthria, no aphasia     Cranial Nerves - CN 2-12 intact     Motor - No focal deficits                       Sensory - Intact to LT     Reflexes - DTR Intact, No primitive reflexive     Balance - WNL Static  Psychiatric - Mood stable, Affect WNL HPI:  89y Female presents s/p unwitnessed fall c/o severe R hip pain and inability to ambulate.  Patient denies radiation of pain. Patient denies numbness/tingling/burning in the RLE. No other bone/joint complaints. Patient is a community ambulator at baseline with assistive devices. Patient has h/o dementia and L femur IMN done at outside hospital two years ago. Patient also takes Xarelto for Afib, last dose yesterday morning.  x-ray   Acute slightly comminuted and offset proximal right femoral   intertrochanteric fracture.        s/p Intramedullary insertion of intertrochanteric antegrade nail into hip  Pain controlled. Sleepy today per family./     REVIEW OF SYSTEMS: No chest pain, shortness of breath, nausea, vomiting or diarhea.      PAST MEDICAL & SURGICAL HISTORY  Varicose Veins of Lower Extremities  MVP (Mitral Valve Prolapse)  Atrial Fibrillation  Hyperlipidemia  IBS (Irritable Bowel Syndrome)  Abdominal Adhesions  S/P Tonsillectomy      SOCIAL HISTORY  Smoking - Denied, EtOH - Denied, Drugs - Denied    FUNCTIONAL HISTORY:   Lives in NH   Independent with walker PTA     CURRENT FUNCTIONAL STATUS: mod a       FAMILY HISTORY       RECENT LABS/IMAGING  CBC Full  -  ( 19 Dec 2018 07:02 )  WBC Count : 10.53 K/uL  Hemoglobin : 10.2 g/dL  Hematocrit : 31.9 %  Platelet Count - Automated : 117 K/uL  Mean Cell Volume : 93.3 fL  Mean Cell Hemoglobin : 29.8 pg  Mean Cell Hemoglobin Concentration : 32.0 %  Auto Neutrophil # : x  Auto Lymphocyte # : x  Auto Monocyte # : x  Auto Eosinophil # : x  Auto Basophil # : x  Auto Neutrophil % : x  Auto Lymphocyte % : x  Auto Monocyte % : x  Auto Eosinophil % : x  Auto Basophil % : x    12-19    145  |  108<H>  |  23  ----------------------------<  130<H>  3.8   |  24  |  0.72    Ca    8.2<L>      19 Dec 2018 07:02    TPro  x   /  Alb  3.7  /  TBili  x   /  DBili  x   /  AST  x   /  ALT  x   /  AlkPhos  x   12-18    Urinalysis Basic - ( 18 Dec 2018 05:40 )    Color: YELLOW / Appearance: CLEAR / S.022 / pH: 6.0  Gluc: NEGATIVE / Ketone: NEGATIVE  / Bili: NEGATIVE / Urobili: NORMAL   Blood: NEGATIVE / Protein: 20 / Nitrite: NEGATIVE   Leuk Esterase: SMALL / RBC: 3-5 / WBC 6-10   Sq Epi: OCC / Non Sq Epi: x / Bacteria: NEGATIVE        VITALS  T(C): 36.9 (18 @ 07:27), Max: 37.1 (18 @ 05:26)  HR: 130 (18 @ 07:27) (86 - 135)  BP: 100/59 (18 @ 07:27) (94/56 - 136/81)  RR: 20 (18 @ 07:27) (14 - 26)  SpO2: 96% (18 @ 07:27) (91% - 100%)  Wt(kg): --    ALLERGIES  No Known Allergies      MEDICATIONS   acetaminophen   Tablet .. 650 milliGRAM(s) Oral every 8 hours PRN  diltiazem Infusion 10 mG/Hr IV Continuous <Continuous>  docusate sodium 300 milliGRAM(s) Oral daily  latanoprost 0.005% Ophthalmic Solution 1 Drop(s) Both EYES at bedtime  levETIRAcetam 500 milliGRAM(s) Oral two times a day  mirtazapine 7.5 milliGRAM(s) Oral at bedtime  morphine  - Injectable 1 milliGRAM(s) IV Push every 2 hours PRN  oxyCODONE    IR 2.5 milliGRAM(s) Oral every 4 hours PRN  oxyCODONE    IR 5 milliGRAM(s) Oral every 4 hours PRN  rivaroxaban 10 milliGRAM(s) Oral every 24 hours  senna 2 Tablet(s) Oral at bedtime      ----------------------------------------------------------------------------------------  PHYSICAL EXAM  Constitutional - NAD, Comfortable  HEENT - NCAT, EOMI  Neck - Supple, No limited ROM  Chest - CTA bilaterally, No wheeze, No rhonchi, No crackles  Cardiovascular - RRR, S1S2, No murmurs  Abdomen - BS+, Soft, NTND  Extremities - No C/C/E, No calf tenderness   Neurologic Exam -                    Cognitive - Awake, Alert, AAO*1     Communication - Fluent, No dysarthria, no aphasia        Motor - grossly intact except RLE due to pain                          Balance -not tested   Psychiatric - Mood stable, Affect WNL

## 2018-12-19 NOTE — PROGRESS NOTE ADULT - SUBJECTIVE AND OBJECTIVE BOX
Orthopedic Surgery Progress Note  Patient agitated overnight, but fell asleep and was fine until this morning when she took off her clothes and is refusing medications. Poor historian    O:  Vital Signs Last 24 Hrs  T(C): 37.1 (19 Dec 2018 05:26), Max: 37.1 (19 Dec 2018 05:26)  T(F): 98.8 (19 Dec 2018 05:26), Max: 98.8 (19 Dec 2018 05:26)  HR: 121 (19 Dec 2018 05:26) (66 - 130)  BP: 136/81 (19 Dec 2018 05:26) (94/56 - 136/81)  BP(mean): 74 (18 Dec 2018 22:00) (60 - 86)  RR: 19 (19 Dec 2018 05:26) (14 - 26)  SpO2: 94% (19 Dec 2018 05:26) (91% - 100%)    Gen: NAD  RLE  Dressings C/D/I  moving leg  unable to obtain detained NV exam  WWP distally    Labs:                        10.9   14.38 )-----------( 126      ( 18 Dec 2018 19:15 )             35.1                         13.4   13.83 )-----------( 153      ( 18 Dec 2018 05:10 )             42.2       12-18    145  |  106  |  25<H>  ----------------------------<  142<H>  4.2   |  26  |  0.75    PT/INR - ( 18 Dec 2018 05:10 )   PT: 15.2 SEC;   INR: 1.36       PTT - ( 18 Dec 2018 05:10 )  PTT:29.4 SEC    A/P 89y year old female POD1 s/p Intramedullary insertion of intertrochanteric antegrade nail into hip    Pain Control  DVT PPX  PT/OOB  WBAT   Dispo Planning    Vinny Rubi MD

## 2018-12-19 NOTE — PROGRESS NOTE ADULT - PROBLEM SELECTOR PLAN 1
Worsened mental status after OR.  Very high risk for post-op delirium. Avoid benzodiazepine, anticholinergics.  Minimize use of opioids.  Discontinue sedating meds.  Poor PO intake - change meds to IV formulation until mental status improves.  Monitor for infection.

## 2018-12-19 NOTE — PROGRESS NOTE ADULT - PROBLEM SELECTOR PLAN 2
complicated by RVR. On Cardizem gtt, Tele monitor.  Appreciate Cardiology consultation.  At risk for CHF, monitor fluid status carefully.

## 2018-12-19 NOTE — PROVIDER CONTACT NOTE (OTHER) - SITUATION
Pt did not eat breakfast or lunch - only had 1 spoon full of vanilla pudding and small bites here and there.

## 2018-12-19 NOTE — OCCUPATIONAL THERAPY INITIAL EVALUATION ADULT - PLANNED THERAPY INTERVENTIONS, OT EVAL
ROM/strengthening/neuromuscular re-education/transfer training/bed mobility training/ADL retraining/balance training

## 2018-12-19 NOTE — PROGRESS NOTE ADULT - ASSESSMENT
Afib  RVR  start cardizem infusion   resume full a/c if deemed safe     abnormal EKG  st wave abn consistent with ischemia  no evidence of acute injury     Severe MR, MVP  pt at high risk of acute CHF  decrease IVF to maintenance fluid   will likely need low dose diuretics

## 2018-12-20 LAB
BUN SERPL-MCNC: 25 MG/DL — HIGH (ref 7–23)
CALCIUM SERPL-MCNC: 8.4 MG/DL — SIGNIFICANT CHANGE UP (ref 8.4–10.5)
CHLORIDE SERPL-SCNC: 108 MMOL/L — HIGH (ref 98–107)
CO2 SERPL-SCNC: 26 MMOL/L — SIGNIFICANT CHANGE UP (ref 22–31)
CREAT SERPL-MCNC: 0.71 MG/DL — SIGNIFICANT CHANGE UP (ref 0.5–1.3)
GLUCOSE SERPL-MCNC: 148 MG/DL — HIGH (ref 70–99)
HCT VFR BLD CALC: 28.2 % — LOW (ref 34.5–45)
HGB BLD-MCNC: 8.9 G/DL — LOW (ref 11.5–15.5)
MCHC RBC-ENTMCNC: 30.2 PG — SIGNIFICANT CHANGE UP (ref 27–34)
MCHC RBC-ENTMCNC: 31.6 % — LOW (ref 32–36)
MCV RBC AUTO: 95.6 FL — SIGNIFICANT CHANGE UP (ref 80–100)
NRBC # FLD: 0.05 — SIGNIFICANT CHANGE UP
PLATELET # BLD AUTO: 129 K/UL — LOW (ref 150–400)
PMV BLD: 12.1 FL — SIGNIFICANT CHANGE UP (ref 7–13)
POTASSIUM SERPL-MCNC: 3.8 MMOL/L — SIGNIFICANT CHANGE UP (ref 3.5–5.3)
POTASSIUM SERPL-SCNC: 3.8 MMOL/L — SIGNIFICANT CHANGE UP (ref 3.5–5.3)
RBC # BLD: 2.95 M/UL — LOW (ref 3.8–5.2)
RBC # FLD: 14.3 % — SIGNIFICANT CHANGE UP (ref 10.3–14.5)
SODIUM SERPL-SCNC: 145 MMOL/L — SIGNIFICANT CHANGE UP (ref 135–145)
WBC # BLD: 7.98 K/UL — SIGNIFICANT CHANGE UP (ref 3.8–10.5)
WBC # FLD AUTO: 7.98 K/UL — SIGNIFICANT CHANGE UP (ref 3.8–10.5)

## 2018-12-20 PROCEDURE — 99232 SBSQ HOSP IP/OBS MODERATE 35: CPT | Mod: GC

## 2018-12-20 PROCEDURE — 99233 SBSQ HOSP IP/OBS HIGH 50: CPT

## 2018-12-20 RX ORDER — METOPROLOL TARTRATE 50 MG
25 TABLET ORAL
Qty: 0 | Refills: 0 | Status: DISCONTINUED | OUTPATIENT
Start: 2018-12-20 | End: 2018-12-24

## 2018-12-20 RX ADMIN — Medication 25 MILLIGRAM(S): at 22:36

## 2018-12-20 RX ADMIN — Medication 11 MG/HR: at 05:57

## 2018-12-20 RX ADMIN — LATANOPROST 1 DROP(S): 0.05 SOLUTION/ DROPS OPHTHALMIC; TOPICAL at 22:36

## 2018-12-20 RX ADMIN — SENNA PLUS 2 TABLET(S): 8.6 TABLET ORAL at 22:36

## 2018-12-20 RX ADMIN — RIVAROXABAN 10 MILLIGRAM(S): KIT at 05:57

## 2018-12-20 RX ADMIN — Medication 11 MG/HR: at 11:15

## 2018-12-20 NOTE — PROGRESS NOTE ADULT - SUBJECTIVE AND OBJECTIVE BOX
HPI:  89y Female presents s/p unwitnessed fall c/o severe R hip pain and inability to ambulate.  Patient denies radiation of pain. Patient denies numbness/tingling/burning in the RLE. No other bone/joint complaints. Patient is a community ambulator at baseline with assistive devices. Patient has h/o dementia and L femur IMN done at outside hospital two years ago. Patient also takes Xarelto for Afib, last dose yesterday morning.  x-ray   Acute slightly comminuted and offset proximal right femoral   intertrochanteric fracture.        s/p Intramedullary insertion of intertrochanteric antegrade nail into hip  Pain controlled. more interactive today.   participated with PT     REVIEW OF SYSTEMS: No chest pain, shortness of breath, nausea, vomiting or diarhea.          CURRENT FUNCTIONAL STATUS: max a       MEDICATIONS  (STANDING):  diltiazem Infusion 11 mG/Hr (11 mL/Hr) IV Continuous <Continuous>  docusate sodium 300 milliGRAM(s) Oral daily  latanoprost 0.005% Ophthalmic Solution 1 Drop(s) Both EYES at bedtime  rivaroxaban 10 milliGRAM(s) Oral every 24 hours  senna 2 Tablet(s) Oral at bedtime    MEDICATIONS  (PRN):  acetaminophen   Tablet .. 650 milliGRAM(s) Oral every 8 hours PRN Temp greater or equal to 38C (100.4F), Mild Pain (1 - 3)  morphine  - Injectable 1 milliGRAM(s) IV Push every 2 hours PRN Breakthrough  oxyCODONE    IR 2.5 milliGRAM(s) Oral every 4 hours PRN Moderate Pain (4 - 6)  oxyCODONE    IR 5 milliGRAM(s) Oral every 4 hours PRN Severe Pain (7 - 10)      Vital Signs Last 24 Hrs  T(C): 36.7 (20 Dec 2018 14:55), Max: 37.7 (19 Dec 2018 20:11)  T(F): 98.1 (20 Dec 2018 14:55), Max: 99.9 (19 Dec 2018 20:11)  HR: 109 (20 Dec 2018 14:55) (91 - 120)  BP: 124/50 (20 Dec 2018 14:55) (98/48 - 124/50)  BP(mean): --  RR: 18 (20 Dec 2018 14:55) (18 - 18)  SpO2: 97% (20 Dec 2018 14:55) (94% - 97%)  ----------------------------------------------------------------------------------------  PHYSICAL EXAM  Constitutional - NAD, Comfortable  HEENT - NCAT, EOMI  Neck - Supple, No limited ROM  Chest - CTA bilaterally, No wheeze, No rhonchi, No crackles  Cardiovascular - RRR, S1S2, No murmurs  Abdomen - BS+, Soft, NTND  Extremities - No C/C/E, No calf tenderness   Neurologic Exam -                    Cognitive - Awake, Alert, AAO*1     Communication - Fluent, No dysarthria, no aphasia        Motor - grossly intact except RLE due to pain                          Balance -not tested   Psychiatric - Mood stable, Affect WNL

## 2018-12-20 NOTE — PROGRESS NOTE ADULT - SUBJECTIVE AND OBJECTIVE BOX
Subjective: Patient seen and examined. No new events except as noted.     SUBJECTIVE/ROS:      MEDICATIONS:  MEDICATIONS  (STANDING):  diltiazem Infusion 11 mG/Hr (11 mL/Hr) IV Continuous <Continuous>  docusate sodium 300 milliGRAM(s) Oral daily  latanoprost 0.005% Ophthalmic Solution 1 Drop(s) Both EYES at bedtime  rivaroxaban 10 milliGRAM(s) Oral every 24 hours  senna 2 Tablet(s) Oral at bedtime      PHYSICAL EXAM:  T(C): 36.7 (12-20-18 @ 05:50), Max: 37.7 (12-19-18 @ 20:11)  HR: 98 (12-20-18 @ 05:50) (98 - 119)  BP: 102/53 (12-20-18 @ 05:50) (98/48 - 102/53)  RR: 18 (12-20-18 @ 05:50) (18 - 18)  SpO2: 97% (12-20-18 @ 05:50) (96% - 97%)  Wt(kg): --  I&O's Summary        JVP: Normal  Neck: supple  Lung: clear   CV: S1 S2 , Murmur:  Abd: soft  Ext: No edema  neuro: Awake   Psych: flat affect  Skin: normal        LABS/DATA:    CARDIAC MARKERS:                                10.2   10.53 )-----------( 117      ( 19 Dec 2018 07:02 )             31.9     12-19    145  |  108<H>  |  23  ----------------------------<  130<H>  3.8   |  24  |  0.72    Ca    8.2<L>      19 Dec 2018 07:02      proBNP:   Lipid Profile:   HgA1c:   TSH:     TELE:  EKG:

## 2018-12-20 NOTE — PROGRESS NOTE ADULT - SUBJECTIVE AND OBJECTIVE BOX
Orthopedic Surgery Progress Note  No acute events overnight. Less agitated. On cardizem gtt for rapid afib, rate currently controlled.    O:  Vital Signs Last 24 Hrs  T(C): 36.7 (20 Dec 2018 05:50), Max: 37.7 (19 Dec 2018 20:11)  T(F): 98 (20 Dec 2018 05:50), Max: 99.9 (19 Dec 2018 20:11)  HR: 98 (20 Dec 2018 05:50) (98 - 135)  BP: 102/53 (20 Dec 2018 05:50) (98/48 - 110/49)  RR: 18 (20 Dec 2018 05:50) (18 - 20)  SpO2: 97% (20 Dec 2018 05:50) (96% - 97%)    Gen: NAD  RLE  Dressings C/D/I  EHL/FHL/TA/GS intact  SILT DP/SP/GRIFFIN/Sa  WWP distally    Labs:                        10.2   10.53 )-----------( 117      ( 19 Dec 2018 07:02 )             31.9                         10.9   14.38 )-----------( 126      ( 18 Dec 2018 19:15 )             35.1     12-19    145  |  108<H>  |  23  ----------------------------<  130<H>  3.8   |  24  |  0.72    PT/INR - ( 19 Dec 2018 07:02 )   PT: 11.8 SEC;   INR: 1.03        PTT - ( 19 Dec 2018 07:02 )  PTT:24.9 SEC    A/P 89y year old female POD2 s/p Intramedullary insertion of intertrochanteric antegrade nail into hip    Pain Control  DVT PPX  PT/OOB  WBAT  Cardizem gtt, rate control per cards   Dispo Planning: back to [facility] for [facility] Rubi MD

## 2018-12-20 NOTE — PROGRESS NOTE ADULT - SUBJECTIVE AND OBJECTIVE BOX
CC: F/U for RVR from Afib    SUBJECTIVE / OVERNIGHT EVENTS:  NO new events overnight.  Still with episodes of RVR despite on cardizem gtt.  No F/C, N/V, CP, SOB, Cough, lightheadedness, dizziness, abdominal pain, diarrhea, dysuria.    MEDICATIONS  (STANDING):  diltiazem    Tablet 90 milliGRAM(s) Oral every 6 hours  diltiazem Infusion 11 mG/Hr (11 mL/Hr) IV Continuous <Continuous>  docusate sodium 300 milliGRAM(s) Oral daily  latanoprost 0.005% Ophthalmic Solution 1 Drop(s) Both EYES at bedtime  rivaroxaban 10 milliGRAM(s) Oral every 24 hours  senna 2 Tablet(s) Oral at bedtime    MEDICATIONS  (PRN):  acetaminophen   Tablet .. 650 milliGRAM(s) Oral every 8 hours PRN Temp greater or equal to 38C (100.4F), Mild Pain (1 - 3)  morphine  - Injectable 1 milliGRAM(s) IV Push every 2 hours PRN Breakthrough  oxyCODONE    IR 2.5 milliGRAM(s) Oral every 4 hours PRN Moderate Pain (4 - 6)  oxyCODONE    IR 5 milliGRAM(s) Oral every 4 hours PRN Severe Pain (7 - 10)      Vital Signs Last 24 Hrs  T(C): 36.7 (20 Dec 2018 14:55), Max: 37.7 (19 Dec 2018 20:11)  T(F): 98.1 (20 Dec 2018 14:55), Max: 99.9 (19 Dec 2018 20:11)  HR: 109 (20 Dec 2018 14:55) (91 - 120)  BP: 124/50 (20 Dec 2018 14:55) (98/48 - 124/50)  BP(mean): --  RR: 18 (20 Dec 2018 14:55) (18 - 18)  SpO2: 97% (20 Dec 2018 14:55) (94% - 97%)  CAPILLARY BLOOD GLUCOSE        I&O's Summary      PHYSICAL EXAM:  GENERAL: NAD  HEAD:  Atraumatic, Normocephalic  EYES: EOMI, PERRLA, conjunctiva and sclera clear  NECK: Supple, No JVD  CHEST/LUNG: Clear to auscultation bilaterally; No wheeze  HEART: AFib, JESSIE; No rubs, or gallops  ABDOMEN: Soft, Nontender, Nondistended; Bowel sounds present  BACK: Non tender, ROM intact.  EXTREMITIES:  2+ Peripheral Pulses, No clubbing, cyanosis. RLE limited ROM due to pain.  PSYCH: Calm  NEUROLOGY: Obtunded, AAOx0, non-focal neurological exam  SKIN: Intact      LABS:                        10.2   10.53 )-----------( 117      ( 19 Dec 2018 07:02 )             31.9     12-20    145  |  108<H>  |  25<H>  ----------------------------<  148<H>  3.8   |  26  |  0.71    Ca    8.4      20 Dec 2018 11:58      PT/INR - ( 19 Dec 2018 07:02 )   PT: 11.8 SEC;   INR: 1.03          PTT - ( 19 Dec 2018 07:02 )  PTT:24.9 SEC          RADIOLOGY & ADDITIONAL TESTS:    Imaging Personally Reviewed:    Care Discussed with Consultants/Other Providers:    Care Discussed with primary team.

## 2018-12-20 NOTE — PROGRESS NOTE ADULT - PROBLEM SELECTOR PLAN 2
Worsened mental status after OR.  Very high risk for post-op delirium. Avoid benzodiazepine, anticholinergics.  Minimize use of opioids.  Discontinue sedating meds.

## 2018-12-20 NOTE — PROGRESS NOTE ADULT - ASSESSMENT
Afib  RVR  cont cardizem infusion , change to PO if she can tolerate PO cardizem 90 q6 plus metoprolol 25 bid  resume full a/c if deemed safe     abnormal EKG  st wave abn consistent with ischemia  no evidence of acute injury     Severe MR, MVP  pt at high risk of acute CHF  off fluids

## 2018-12-20 NOTE — PROGRESS NOTE ADULT - PROBLEM SELECTOR PLAN 1
complicated by RVR. On Cardizem gtt, Tele monitor.  Appreciate Cardiology consultation.  At risk for CHF, monitor fluid status carefully. Start PO Cardizem and titrate down on cardizem gtt.

## 2018-12-21 DIAGNOSIS — G40.909 EPILEPSY, UNSPECIFIED, NOT INTRACTABLE, WITHOUT STATUS EPILEPTICUS: ICD-10-CM

## 2018-12-21 LAB — LEVETIRACETAM SERPL-MCNC: 8.9 MCG/ML — LOW (ref 12–46)

## 2018-12-21 PROCEDURE — 99233 SBSQ HOSP IP/OBS HIGH 50: CPT

## 2018-12-21 RX ORDER — MIRTAZAPINE 45 MG/1
7.5 TABLET, ORALLY DISINTEGRATING ORAL EVERY 12 HOURS
Qty: 0 | Refills: 0 | Status: DISCONTINUED | OUTPATIENT
Start: 2018-12-21 | End: 2018-12-25

## 2018-12-21 RX ADMIN — RIVAROXABAN 10 MILLIGRAM(S): KIT at 05:15

## 2018-12-21 RX ADMIN — Medication 650 MILLIGRAM(S): at 11:00

## 2018-12-21 RX ADMIN — MIRTAZAPINE 7.5 MILLIGRAM(S): 45 TABLET, ORALLY DISINTEGRATING ORAL at 21:07

## 2018-12-21 RX ADMIN — Medication 300 MILLIGRAM(S): at 12:20

## 2018-12-21 RX ADMIN — Medication 25 MILLIGRAM(S): at 06:31

## 2018-12-21 RX ADMIN — LATANOPROST 1 DROP(S): 0.05 SOLUTION/ DROPS OPHTHALMIC; TOPICAL at 21:08

## 2018-12-21 RX ADMIN — SENNA PLUS 2 TABLET(S): 8.6 TABLET ORAL at 21:07

## 2018-12-21 RX ADMIN — Medication 25 MILLIGRAM(S): at 17:42

## 2018-12-21 RX ADMIN — Medication 650 MILLIGRAM(S): at 10:32

## 2018-12-21 NOTE — PROGRESS NOTE ADULT - ASSESSMENT
Afib  RVR  stable hr now   cont current meds     abnormal EKG  st wave abn consistent with ischemia  no evidence of acute injury     Severe MR, MVP  pt at high risk of acute CHF  off fluids

## 2018-12-21 NOTE — PROGRESS NOTE ADULT - PROBLEM SELECTOR PLAN 1
complicated by RVR. Off Cardizem gtt now.  Continue Cardizem PO and Lopressor PO, Continue tele monitor.  Appreciate Cardiology consultation.  At risk for CHF, monitor fluid status carefully.

## 2018-12-21 NOTE — PROGRESS NOTE ADULT - PROBLEM SELECTOR PLAN 2
Worsened mental status after OR.  Very high risk for post-op delirium. Avoid benzodiazepine, anticholinergics.  Minimize use of opioids.  Resume risperdal at her home dose.

## 2018-12-21 NOTE — PROGRESS NOTE ADULT - SUBJECTIVE AND OBJECTIVE BOX
Subjective: Patient seen and examined. No new events except as noted.     SUBJECTIVE/ROS:  Due to altered mental status, subjective information were not able to be obtained from the patient. History was obtained, to the extent possible, from review of the chart and collateral sources of information.        MEDICATIONS:  MEDICATIONS  (STANDING):  diltiazem    Tablet 90 milliGRAM(s) Oral every 6 hours  docusate sodium 300 milliGRAM(s) Oral daily  latanoprost 0.005% Ophthalmic Solution 1 Drop(s) Both EYES at bedtime  metoprolol tartrate 25 milliGRAM(s) Oral two times a day  rivaroxaban 10 milliGRAM(s) Oral every 24 hours  senna 2 Tablet(s) Oral at bedtime      PHYSICAL EXAM:  T(C): 36.7 (12-21-18 @ 06:25), Max: 37.1 (12-20-18 @ 19:56)  HR: 92 (12-21-18 @ 06:25) (91 - 125)  BP: 113/59 (12-21-18 @ 06:25) (103/68 - 124/50)  RR: 18 (12-21-18 @ 06:25) (18 - 20)  SpO2: 95% (12-21-18 @ 06:25) (79% - 97%)  Wt(kg): --  I&O's Summary      JVP: Normal  Neck: supple  Lung: clear   CV: S1 S2 , Murmur:  Abd: soft  Ext: No edema  neuro: Awake /  Psych: flat affect  Skin: normal        LABS/DATA:    CARDIAC MARKERS:                                8.9    7.98  )-----------( 129      ( 20 Dec 2018 14:10 )             28.2     12-20    145  |  108<H>  |  25<H>  ----------------------------<  148<H>  3.8   |  26  |  0.71    Ca    8.4      20 Dec 2018 11:58      proBNP:   Lipid Profile:   HgA1c:   TSH:     TELE:  EKG:

## 2018-12-21 NOTE — PROGRESS NOTE ADULT - SUBJECTIVE AND OBJECTIVE BOX
CC: F/U for afib    SUBJECTIVE / OVERNIGHT EVENTS:  Patient has dementia with behavior disturbance - obtaining history is limited.  As per aid, patient did not sleep much through the night.  On examination this morning, patient was sleepy, but arousable.  No overnight issues with F/C, N/V, CP, SOB, Cough, lightheadedness, dizziness, abdominal pain, diarrhea, dysuria.    MEDICATIONS  (STANDING):  diltiazem    Tablet 90 milliGRAM(s) Oral every 6 hours  docusate sodium 300 milliGRAM(s) Oral daily  latanoprost 0.005% Ophthalmic Solution 1 Drop(s) Both EYES at bedtime  metoprolol tartrate 25 milliGRAM(s) Oral two times a day  mirtazapine 7.5 milliGRAM(s) Oral every 12 hours  rivaroxaban 10 milliGRAM(s) Oral every 24 hours  senna 2 Tablet(s) Oral at bedtime    MEDICATIONS  (PRN):  acetaminophen   Tablet .. 650 milliGRAM(s) Oral every 8 hours PRN Temp greater or equal to 38C (100.4F), Mild Pain (1 - 3)  morphine  - Injectable 1 milliGRAM(s) IV Push every 2 hours PRN Breakthrough  oxyCODONE    IR 2.5 milliGRAM(s) Oral every 4 hours PRN Moderate Pain (4 - 6)  oxyCODONE    IR 5 milliGRAM(s) Oral every 4 hours PRN Severe Pain (7 - 10)      Vital Signs Last 24 Hrs  T(C): 36.7 (21 Dec 2018 06:25), Max: 37.1 (20 Dec 2018 19:56)  T(F): 98 (21 Dec 2018 06:25), Max: 98.7 (20 Dec 2018 19:56)  HR: 92 (21 Dec 2018 06:25) (91 - 125)  BP: 113/59 (21 Dec 2018 06:25) (109/60 - 124/50)  BP(mean): --  RR: 18 (21 Dec 2018 06:25) (18 - 20)  SpO2: 95% (21 Dec 2018 06:25) (79% - 97%)  CAPILLARY BLOOD GLUCOSE        I&O's Summary      PHYSICAL EXAM:  GENERAL: NAD  HEAD:  Atraumatic, Normocephalic  EYES: EOMI, PERRLA, conjunctiva and sclera clear  NECK: Supple, No JVD  CHEST/LUNG: Clear to auscultation bilaterally; No wheeze  HEART: AFib, JESSIE; No rubs, or gallops  ABDOMEN: Soft, Nontender, Nondistended; Bowel sounds present  BACK: Non tender, ROM intact.  EXTREMITIES:  2+ Peripheral Pulses, No clubbing, cyanosis. RLE limited ROM due to pain.  PSYCH: Calm  NEUROLOGY: Sleepy, AAOx0, non-focal neurological exam  SKIN: Intact    LABS:                        8.9    7.98  )-----------( 129      ( 20 Dec 2018 14:10 )             28.2     12-20    145  |  108<H>  |  25<H>  ----------------------------<  148<H>  3.8   |  26  |  0.71    Ca    8.4      20 Dec 2018 11:58                RADIOLOGY & ADDITIONAL TESTS:    Imaging Personally Reviewed:    Care Discussed with Consultants/Other Providers:    Care Discussed with primary team.

## 2018-12-21 NOTE — PROGRESS NOTE ADULT - SUBJECTIVE AND OBJECTIVE BOX
Orthopedic Surgery Progress Note  No acute events overnight.  Patient less agitated this AM.    O:  Vital Signs Last 24 Hrs  T(C): 36.7 (21 Dec 2018 06:25), Max: 37.1 (20 Dec 2018 19:56)  T(F): 98 (21 Dec 2018 06:25), Max: 98.7 (20 Dec 2018 19:56)  HR: 92 (21 Dec 2018 06:25) (91 - 125)  BP: 113/59 (21 Dec 2018 06:25) (103/68 - 124/50)  RR: 18 (21 Dec 2018 06:25) (18 - 20)  SpO2: 95% (21 Dec 2018 06:25) (79% - 97%)    Gen: NAD  RLE  incisions c/d/i, staples in place  patient non-compliant with detailed neurologic exam  WWP distally    Labs:                        8.9    7.98  )-----------( 129      ( 20 Dec 2018 14:10 )             28.2                         10.2   10.53 )-----------( 117      ( 19 Dec 2018 07:02 )             31.9     12-20    145  |  108<H>  |  25<H>  ----------------------------<  148<H>  3.8   |  26  |  0.71    PT/INR - ( 19 Dec 2018 07:02 )   PT: 11.8 SEC;   INR: 1.03       PTT - ( 19 Dec 2018 07:02 )  PTT:24.9 SEC    A/P 89y year old female POD3 s/p Intramedullary insertion of intertrochanteric antegrade nail into hip    Pain Control  DVT PPX: xarelto  PT/OOB  WBAT   PO meds for rate control per cards, rate currently controlled  Dispo Planning: DC to [facility] for [facility] when arranged, trial of enhanced supervision    Vinny Rubi MD

## 2018-12-22 PROCEDURE — 99233 SBSQ HOSP IP/OBS HIGH 50: CPT

## 2018-12-22 RX ORDER — TRAMADOL HYDROCHLORIDE 50 MG/1
50 TABLET ORAL EVERY 6 HOURS
Qty: 0 | Refills: 0 | Status: DISCONTINUED | OUTPATIENT
Start: 2018-12-22 | End: 2018-12-24

## 2018-12-22 RX ORDER — OXYCODONE HYDROCHLORIDE 5 MG/1
2.5 TABLET ORAL EVERY 4 HOURS
Qty: 0 | Refills: 0 | Status: DISCONTINUED | OUTPATIENT
Start: 2018-12-22 | End: 2018-12-24

## 2018-12-22 RX ORDER — TRAMADOL HYDROCHLORIDE 50 MG/1
25 TABLET ORAL EVERY 6 HOURS
Qty: 0 | Refills: 0 | Status: DISCONTINUED | OUTPATIENT
Start: 2018-12-22 | End: 2018-12-24

## 2018-12-22 RX ADMIN — Medication 650 MILLIGRAM(S): at 12:25

## 2018-12-22 RX ADMIN — Medication 25 MILLIGRAM(S): at 06:20

## 2018-12-22 RX ADMIN — SENNA PLUS 2 TABLET(S): 8.6 TABLET ORAL at 22:44

## 2018-12-22 RX ADMIN — LATANOPROST 1 DROP(S): 0.05 SOLUTION/ DROPS OPHTHALMIC; TOPICAL at 22:44

## 2018-12-22 RX ADMIN — MIRTAZAPINE 7.5 MILLIGRAM(S): 45 TABLET, ORALLY DISINTEGRATING ORAL at 19:46

## 2018-12-22 RX ADMIN — Medication 650 MILLIGRAM(S): at 13:20

## 2018-12-22 RX ADMIN — Medication 300 MILLIGRAM(S): at 12:25

## 2018-12-22 RX ADMIN — OXYCODONE HYDROCHLORIDE 5 MILLIGRAM(S): 5 TABLET ORAL at 15:35

## 2018-12-22 RX ADMIN — RIVAROXABAN 10 MILLIGRAM(S): KIT at 06:20

## 2018-12-22 RX ADMIN — OXYCODONE HYDROCHLORIDE 5 MILLIGRAM(S): 5 TABLET ORAL at 16:30

## 2018-12-22 NOTE — PROGRESS NOTE ADULT - SUBJECTIVE AND OBJECTIVE BOX
Orthopedic Surgery Progress Note  No acute events overnight. Patient less agitated, on enhanced supervision.    O:  Vital Signs Last 24 Hrs  T(C): 36.7 (21 Dec 2018 23:16), Max: 36.8 (21 Dec 2018 14:30)  T(F): 98.1 (21 Dec 2018 23:16), Max: 98.3 (21 Dec 2018 14:30)  HR: 84 (21 Dec 2018 23:16) (80 - 92)  BP: 105/55 (21 Dec 2018 23:16) (99/55 - 126/59)  RR: 18 (21 Dec 2018 23:16) (18 - 20)  SpO2: 99% (21 Dec 2018 23:16) (94% - 99%)    Gen: NAD  RLE  incisions c/d/i  Patient non-compliant with neuro exam  WWP distally    Labs:                        8.9    7.98  )-----------( 129      ( 20 Dec 2018 14:10 )             28.2     12-20    145  |  108<H>  |  25<H>  ----------------------------<  148<H>  3.8   |  26  |  0.71    A/P 89y year old female POD4 s/p Intramedullary insertion of intertrochanteric antegrade nail into hip    Pain Control  DVT PPX  PT/OOB  WBAT   PO rate control meds per cards  Dispo Planning    Vinny Rubi MD

## 2018-12-22 NOTE — PROGRESS NOTE ADULT - SUBJECTIVE AND OBJECTIVE BOX
Patient is a 89y old  Female who presents with a chief complaint of R hip pain (22 Dec 2018 12:41)      SUBJECTIVE / OVERNIGHT EVENTS:  Pt reports feeling well, denied cp/sob/palpitation.     MEDICATIONS  (STANDING):  diltiazem    Tablet 90 milliGRAM(s) Oral every 6 hours  docusate sodium 300 milliGRAM(s) Oral daily  latanoprost 0.005% Ophthalmic Solution 1 Drop(s) Both EYES at bedtime  metoprolol tartrate 25 milliGRAM(s) Oral two times a day  mirtazapine 7.5 milliGRAM(s) Oral every 12 hours  rivaroxaban 10 milliGRAM(s) Oral every 24 hours  senna 2 Tablet(s) Oral at bedtime    MEDICATIONS  (PRN):  acetaminophen   Tablet .. 650 milliGRAM(s) Oral every 8 hours PRN Temp greater or equal to 38C (100.4F), Mild Pain (1 - 3)  morphine  - Injectable 1 milliGRAM(s) IV Push every 2 hours PRN Breakthrough  oxyCODONE    IR 2.5 milliGRAM(s) Oral every 4 hours PRN Moderate Pain (4 - 6)  oxyCODONE    IR 5 milliGRAM(s) Oral every 4 hours PRN Severe Pain (7 - 10)      T(C): 36.6 (12-22-18 @ 12:22), Max: 36.8 (12-21-18 @ 17:40)  HR: 88 (12-22-18 @ 12:22) (80 - 99)  BP: 110/52 (12-22-18 @ 12:22) (99/55 - 126/64)  RR: 18 (12-22-18 @ 12:22) (18 - 18)  SpO2: 99% (12-22-18 @ 12:22) (98% - 99%)  CAPILLARY BLOOD GLUCOSE        I&O's Summary      PHYSICAL EXAM:  GENERAL: NAD  HEAD:  Atraumatic, Normocephalic  EYES: EOMI, PERRLA, conjunctiva and sclera clear  NECK: Supple, No JVD  CHEST/LUNG: Clear to auscultation bilaterally; No wheeze  HEART: irregular rhythm, No rubs, or gallops  ABDOMEN: Soft, Nontender, Nondistended; Bowel sounds present  EXTREMITIES:  2+ Peripheral Pulses, No clubbing, cyanosis. RLE limited ROM due to pain.  PSYCH: Calm  NEUROLOGY: awake, alert, non-focal neurological exam  SKIN: Intact    LABS:                    RADIOLOGY & ADDITIONAL TESTS:    Imaging Personally Reviewed:    Consultant(s) Notes Reviewed:      Care Discussed with Consultants/Other Providers:

## 2018-12-22 NOTE — PROGRESS NOTE ADULT - ASSESSMENT
Afib  stable hr   cont current meds     abnormal EKG  st wave abn consistent with ischemia  no evidence of acute injury     Severe MR, MVP  pt at high risk of acute CHF  off fluids

## 2018-12-22 NOTE — PROGRESS NOTE ADULT - PROBLEM SELECTOR PLAN 2
Very high risk for post-op delirium. Avoid benzodiazepine, anticholinergics.  Minimize use of opioids.  Resume risperdal at her home dose.  Pt is awake, alert, calm when assessed at noon

## 2018-12-22 NOTE — PROGRESS NOTE ADULT - SUBJECTIVE AND OBJECTIVE BOX
Subjective: Patient seen and examined. No new events except as noted.     SUBJECTIVE/ROS:        MEDICATIONS:  MEDICATIONS  (STANDING):  diltiazem    Tablet 90 milliGRAM(s) Oral every 6 hours  docusate sodium 300 milliGRAM(s) Oral daily  latanoprost 0.005% Ophthalmic Solution 1 Drop(s) Both EYES at bedtime  metoprolol tartrate 25 milliGRAM(s) Oral two times a day  mirtazapine 7.5 milliGRAM(s) Oral every 12 hours  rivaroxaban 10 milliGRAM(s) Oral every 24 hours  senna 2 Tablet(s) Oral at bedtime      PHYSICAL EXAM:  T(C): 36.6 (12-22-18 @ 12:22), Max: 36.8 (12-21-18 @ 14:30)  HR: 88 (12-22-18 @ 12:22) (80 - 99)  BP: 110/52 (12-22-18 @ 12:22) (99/55 - 126/64)  RR: 18 (12-22-18 @ 12:22) (18 - 18)  SpO2: 99% (12-22-18 @ 12:22) (95% - 99%)  Wt(kg): --  I&O's Summary      JVP: Normal  Neck: supple  Lung: clear   CV: S1 S2 , Murmur:  Abd: soft  Ext: No edema  neuro: Awake / alert  Psych: flat affect  Skin: normal      LABS/DATA:    CARDIAC MARKERS:                                8.9    7.98  )-----------( 129      ( 20 Dec 2018 14:10 )             28.2           proBNP:   Lipid Profile:   HgA1c:   TSH:     TELE:  EKG:

## 2018-12-23 PROCEDURE — 99233 SBSQ HOSP IP/OBS HIGH 50: CPT

## 2018-12-23 RX ADMIN — TRAMADOL HYDROCHLORIDE 25 MILLIGRAM(S): 50 TABLET ORAL at 12:30

## 2018-12-23 RX ADMIN — Medication 650 MILLIGRAM(S): at 06:01

## 2018-12-23 RX ADMIN — Medication 25 MILLIGRAM(S): at 21:40

## 2018-12-23 RX ADMIN — Medication 300 MILLIGRAM(S): at 11:27

## 2018-12-23 RX ADMIN — MIRTAZAPINE 7.5 MILLIGRAM(S): 45 TABLET, ORALLY DISINTEGRATING ORAL at 07:43

## 2018-12-23 RX ADMIN — RIVAROXABAN 10 MILLIGRAM(S): KIT at 05:21

## 2018-12-23 RX ADMIN — TRAMADOL HYDROCHLORIDE 25 MILLIGRAM(S): 50 TABLET ORAL at 11:32

## 2018-12-23 RX ADMIN — Medication 650 MILLIGRAM(S): at 07:00

## 2018-12-23 RX ADMIN — Medication 25 MILLIGRAM(S): at 05:21

## 2018-12-23 RX ADMIN — LATANOPROST 1 DROP(S): 0.05 SOLUTION/ DROPS OPHTHALMIC; TOPICAL at 21:39

## 2018-12-23 RX ADMIN — SENNA PLUS 2 TABLET(S): 8.6 TABLET ORAL at 21:40

## 2018-12-23 NOTE — PROGRESS NOTE ADULT - SUBJECTIVE AND OBJECTIVE BOX
Patient is a 89y old  Female who presents with a chief complaint of R hip pain (23 Dec 2018 09:59)      SUBJECTIVE / OVERNIGHT EVENTS:  Pt was seen and examined in AM. Pt is less interactive today.     MEDICATIONS  (STANDING):  diltiazem    Tablet 90 milliGRAM(s) Oral every 6 hours  docusate sodium 300 milliGRAM(s) Oral daily  latanoprost 0.005% Ophthalmic Solution 1 Drop(s) Both EYES at bedtime  metoprolol tartrate 25 milliGRAM(s) Oral two times a day  mirtazapine 7.5 milliGRAM(s) Oral every 12 hours  rivaroxaban 10 milliGRAM(s) Oral every 24 hours  senna 2 Tablet(s) Oral at bedtime    MEDICATIONS  (PRN):  acetaminophen   Tablet .. 650 milliGRAM(s) Oral every 8 hours PRN Temp greater or equal to 38C (100.4F), Mild Pain (1 - 3)  oxyCODONE    IR 2.5 milliGRAM(s) Oral every 4 hours PRN breakthrough  traMADol 25 milliGRAM(s) Oral every 6 hours PRN Moderate Pain (4 - 6)  traMADol 50 milliGRAM(s) Oral every 6 hours PRN Severe Pain (7 - 10)      T(C): 37 (12-23-18 @ 15:20), Max: 37.4 (12-23-18 @ 05:16)  HR: 89 (12-23-18 @ 15:20) (81 - 100)  BP: 106/58 (12-23-18 @ 15:20) (101/61 - 127/62)  RR: 18 (12-23-18 @ 15:20) (16 - 18)  SpO2: 98% (12-23-18 @ 15:20) (95% - 99%)  CAPILLARY BLOOD GLUCOSE        I&O's Summary      PHYSICAL EXAM:  GENERAL: NAD  HEAD:  Atraumatic, Normocephalic  EYES: EOMI, PERRLA, conjunctiva and sclera clear  NECK: Supple, No JVD  CHEST/LUNG: Clear to auscultation bilaterally; No wheeze  HEART: irregular rhythm, No rubs, or gallops  ABDOMEN: Soft, Nontender, Nondistended; Bowel sounds present  EXTREMITIES:  2+ Peripheral Pulses, No clubbing, cyanosis  PSYCH: Calm  NEUROLOGY: awake, alert, non-focal neurological exam  SKIN: Intact    LABS:                    RADIOLOGY & ADDITIONAL TESTS:    Imaging Personally Reviewed:    Consultant(s) Notes Reviewed:      Care Discussed with Consultants/Other Providers:

## 2018-12-23 NOTE — PROGRESS NOTE ADULT - PROBLEM SELECTOR PLAN 2
Very high risk for post-op delirium. Avoid benzodiazepine, anticholinergics.  Minimize use of opioids.  Resume Risperdal at her home dose.  Pt is awake, alert

## 2018-12-23 NOTE — PROGRESS NOTE ADULT - SUBJECTIVE AND OBJECTIVE BOX
Ortho Progress Note    S: Patient seen and examined. No acute events overnight. Pain well controlled with current regimen. Denies lightheadedness/dizziness, CP/SOB. Tolerating diet. Switched from constant to enhanced obs last night, since patient was calm      O:  Physical Exam:  Gen: Laying in bed, NAD, alert    Resp: Unlabored breathing  Ext: EHL/FHL/TA/Sol intact          + SILT DP/SP/GRIFFIN/Sa          +DP, extremity WWP  Incision: c/d/i, no erythema or edema      Vital Signs Last 24 Hrs  T(C): 37.4 (23 Dec 2018 05:16), Max: 37.4 (23 Dec 2018 05:16)  T(F): 99.4 (23 Dec 2018 05:16), Max: 99.4 (23 Dec 2018 05:16)  HR: 86 (23 Dec 2018 05:16) (81 - 89)  BP: 114/61 (23 Dec 2018 05:16) (101/61 - 127/62)  BP(mean): --  RR: 16 (23 Dec 2018 05:16) (16 - 18)  SpO2: 96% (23 Dec 2018 05:16) (96% - 99%)

## 2018-12-23 NOTE — PROGRESS NOTE ADULT - PROBLEM SELECTOR PLAN 1
complicated by RVR. Off Cardizem gtt now.  Continue Cardizem PO and Lopressor PO.  Appreciate Cardiology consultation.  At risk for CHF, monitor fluid status carefully.  rate controlled now

## 2018-12-23 NOTE — PROGRESS NOTE ADULT - ASSESSMENT
A/P 89y year old female POD4 s/p Intramedullary insertion of intertrochanteric antegrade nail into hip    Pain Control  DVT PPX  PT/OOB  WBAT   PO rate control meds per cards  Dispo Planning: back to skilled nursing facility    Ortho [id number]

## 2018-12-23 NOTE — PROGRESS NOTE ADULT - SUBJECTIVE AND OBJECTIVE BOX
Subjective: Patient seen and examined. No new events except as noted.     SUBJECTIVE/ROS:  feels ok       MEDICATIONS:  MEDICATIONS  (STANDING):  diltiazem    Tablet 90 milliGRAM(s) Oral every 6 hours  docusate sodium 300 milliGRAM(s) Oral daily  latanoprost 0.005% Ophthalmic Solution 1 Drop(s) Both EYES at bedtime  metoprolol tartrate 25 milliGRAM(s) Oral two times a day  mirtazapine 7.5 milliGRAM(s) Oral every 12 hours  rivaroxaban 10 milliGRAM(s) Oral every 24 hours  senna 2 Tablet(s) Oral at bedtime      PHYSICAL EXAM:  T(C): 37.4 (12-23-18 @ 05:16), Max: 37.4 (12-23-18 @ 05:16)  HR: 86 (12-23-18 @ 05:16) (81 - 89)  BP: 114/61 (12-23-18 @ 05:16) (101/61 - 127/62)  RR: 16 (12-23-18 @ 05:16) (16 - 18)  SpO2: 96% (12-23-18 @ 05:16) (96% - 99%)  Wt(kg): --  I&O's Summary      JVP: Normal  Neck: supple  Lung: clear   CV: S1 S2 , Murmur:  Abd: soft  Ext: No edema  neuro: Awake / alert  Psych: flat affect  Skin: normal        LABS/DATA:    CARDIAC MARKERS:                  proBNP:   Lipid Profile:   HgA1c:   TSH:     TELE:  EKG:

## 2018-12-24 DIAGNOSIS — D50.0 IRON DEFICIENCY ANEMIA SECONDARY TO BLOOD LOSS (CHRONIC): ICD-10-CM

## 2018-12-24 LAB
BLD GP AB SCN SERPL QL: NEGATIVE — SIGNIFICANT CHANGE UP
BUN SERPL-MCNC: 28 MG/DL — HIGH (ref 7–23)
CALCIUM SERPL-MCNC: 9.1 MG/DL — SIGNIFICANT CHANGE UP (ref 8.4–10.5)
CHLORIDE SERPL-SCNC: 104 MMOL/L — SIGNIFICANT CHANGE UP (ref 98–107)
CO2 SERPL-SCNC: 28 MMOL/L — SIGNIFICANT CHANGE UP (ref 22–31)
CREAT SERPL-MCNC: 0.54 MG/DL — SIGNIFICANT CHANGE UP (ref 0.5–1.3)
GLUCOSE SERPL-MCNC: 108 MG/DL — HIGH (ref 70–99)
HCT VFR BLD CALC: 29.5 % — LOW (ref 34.5–45)
HGB BLD-MCNC: 9.2 G/DL — LOW (ref 11.5–15.5)
MCHC RBC-ENTMCNC: 29.9 PG — SIGNIFICANT CHANGE UP (ref 27–34)
MCHC RBC-ENTMCNC: 31.2 % — LOW (ref 32–36)
MCV RBC AUTO: 95.8 FL — SIGNIFICANT CHANGE UP (ref 80–100)
NRBC # FLD: 0.07 — SIGNIFICANT CHANGE UP
PLATELET # BLD AUTO: 279 K/UL — SIGNIFICANT CHANGE UP (ref 150–400)
PMV BLD: 10.9 FL — SIGNIFICANT CHANGE UP (ref 7–13)
POTASSIUM SERPL-MCNC: 4 MMOL/L — SIGNIFICANT CHANGE UP (ref 3.5–5.3)
POTASSIUM SERPL-SCNC: 4 MMOL/L — SIGNIFICANT CHANGE UP (ref 3.5–5.3)
RBC # BLD: 3.08 M/UL — LOW (ref 3.8–5.2)
RBC # FLD: 14.8 % — HIGH (ref 10.3–14.5)
RH IG SCN BLD-IMP: POSITIVE — SIGNIFICANT CHANGE UP
SODIUM SERPL-SCNC: 144 MMOL/L — SIGNIFICANT CHANGE UP (ref 135–145)
WBC # BLD: 8.53 K/UL — SIGNIFICANT CHANGE UP (ref 3.8–10.5)
WBC # FLD AUTO: 8.53 K/UL — SIGNIFICANT CHANGE UP (ref 3.8–10.5)

## 2018-12-24 PROCEDURE — 93970 EXTREMITY STUDY: CPT | Mod: 26

## 2018-12-24 PROCEDURE — 93010 ELECTROCARDIOGRAM REPORT: CPT | Mod: 59

## 2018-12-24 PROCEDURE — 93010 ELECTROCARDIOGRAM REPORT: CPT

## 2018-12-24 PROCEDURE — 99233 SBSQ HOSP IP/OBS HIGH 50: CPT

## 2018-12-24 RX ORDER — METOPROLOL TARTRATE 50 MG
50 TABLET ORAL
Qty: 0 | Refills: 0 | Status: DISCONTINUED | OUTPATIENT
Start: 2018-12-24 | End: 2018-12-25

## 2018-12-24 RX ORDER — METOPROLOL TARTRATE 50 MG
25 TABLET ORAL ONCE
Qty: 0 | Refills: 0 | Status: COMPLETED | OUTPATIENT
Start: 2018-12-24 | End: 2018-12-24

## 2018-12-24 RX ADMIN — RIVAROXABAN 10 MILLIGRAM(S): KIT at 05:25

## 2018-12-24 RX ADMIN — Medication 25 MILLIGRAM(S): at 01:16

## 2018-12-24 RX ADMIN — Medication 50 MILLIGRAM(S): at 12:24

## 2018-12-24 RX ADMIN — Medication 50 MILLIGRAM(S): at 23:25

## 2018-12-24 NOTE — PROGRESS NOTE ADULT - PROBLEM SELECTOR PLAN 5
Likely post-op blood loss.  Monitor H/H. Likely post-op blood loss.  Monitor H/H.  No clinical indication for PRBC transfusion today.

## 2018-12-24 NOTE — PROGRESS NOTE ADULT - ASSESSMENT
Afib  agree with increasing metoprolol   cont current meds     Severe MR, MVP  pt at high risk of acute CHF  off fluids

## 2018-12-24 NOTE — PROGRESS NOTE ADULT - PROBLEM SELECTOR PLAN 2
Very high risk for post-op delirium. Avoid benzodiazepine, anticholinergics.  Minimize use of opioids.  Resume Risperdal at her home dose.

## 2018-12-24 NOTE — PROGRESS NOTE ADULT - ASSESSMENT
A/P 89yo F s/p right hip IMN POD #6    Pain Control  DVT PPX  PT/OOB  WBAT   F/u lopressor dose as per hospitalist/cardiologist  Transfer back to tele for further cardiac monitoring    Ortho [id number]

## 2018-12-24 NOTE — PROGRESS NOTE ADULT - SUBJECTIVE AND OBJECTIVE BOX
Ortho Progress Note    S: Patient seen and examined. Patient went into rapid afib overnight.  Lopressor was increased and HR improved. Pain appears well controlled with current regimen.    Vital Signs Last 24 Hrs  T(C): 37.5 (24 Dec 2018 05:23), Max: 37.7 (23 Dec 2018 21:26)  T(F): 99.5 (24 Dec 2018 05:23), Max: 99.9 (23 Dec 2018 21:26)  HR: 111 (24 Dec 2018 05:23) (64 - 153)  BP: 116/64 (24 Dec 2018 05:23) (106/58 - 133/78)  BP(mean): --  RR: 16 (24 Dec 2018 05:23) (16 - 18)  SpO2: 96% (24 Dec 2018 05:23) (92% - 99%)    O:  Physical Exam:  Gen: Laying in bed, NAD, alert    Resp: Unlabored breathing  Ext: EHL/FHL/TA/Sol intact          + SILT DP/SP/GRIFFIN/Sa          +DP, extremity WWP  Incision: c/d/i, no erythema or edema Ortho Progress Note    S: Patient seen and examined. Patient went into rapid afib overnight.  Lopressor was increased and HR improved. Pain appears well controlled with current regimen.    Vital Signs Last 24 Hrs  T(C): 37.5 (24 Dec 2018 05:23), Max: 37.7 (23 Dec 2018 21:26)  T(F): 99.5 (24 Dec 2018 05:23), Max: 99.9 (23 Dec 2018 21:26)  HR: 111 (24 Dec 2018 05:23) (64 - 153)  BP: 116/64 (24 Dec 2018 05:23) (106/58 - 133/78)  BP(mean): --  RR: 16 (24 Dec 2018 05:23) (16 - 18)  SpO2: 96% (24 Dec 2018 05:23) (92% - 99%)    O:  Physical Exam:  Gen: Laying in bed, NAD, alert    Resp: Unlabored breathing  Ext: Moving right foot, motor and sensory exam limited secondary to underlying dementia, +1 DP pulse, extremity warm, brisk cap refill  Incision: c/d/i, no erythema or edema

## 2018-12-24 NOTE — PROVIDER CONTACT NOTE (OTHER) - ACTION/TREATMENT ORDERED:
change dressing and apply gauze and tegaderm
continue to monitor. no intervention at this time
continue to change and reinforce dressing. pt on xarelto so incision will continue to bleed
continue to monitor
no intervention at this time. continue to monitor
will order one to one and IV cardizem
Continue to monitor. Continue to offer foods/ liquids.
No actions ordered by ortho resident at this time, will continue to monitor and recheck vitals
continue to monitor

## 2018-12-24 NOTE — PROGRESS NOTE ADULT - SUBJECTIVE AND OBJECTIVE BOX
CC: F/U for tachycardia    SUBJECTIVE / OVERNIGHT EVENTS:  Patient unable to make her needs known.  No specific discomfort noted this morning.  No overnight issues with F/C, N/V, CP, SOB, Cough, lightheadedness, dizziness, abdominal pain, diarrhea, dysuria.    MEDICATIONS  (STANDING):  diltiazem    Tablet 90 milliGRAM(s) Oral every 6 hours  docusate sodium 300 milliGRAM(s) Oral daily  latanoprost 0.005% Ophthalmic Solution 1 Drop(s) Both EYES at bedtime  metoprolol tartrate 50 milliGRAM(s) Oral two times a day  mirtazapine 7.5 milliGRAM(s) Oral every 12 hours  rivaroxaban 10 milliGRAM(s) Oral every 24 hours  senna 2 Tablet(s) Oral at bedtime    MEDICATIONS  (PRN):  acetaminophen   Tablet .. 650 milliGRAM(s) Oral every 8 hours PRN Temp greater or equal to 38C (100.4F), Mild Pain (1 - 3)  oxyCODONE    IR 2.5 milliGRAM(s) Oral every 4 hours PRN breakthrough  traMADol 25 milliGRAM(s) Oral every 6 hours PRN Moderate Pain (4 - 6)  traMADol 50 milliGRAM(s) Oral every 6 hours PRN Severe Pain (7 - 10)      Vital Signs Last 24 Hrs  T(C): 37.5 (24 Dec 2018 05:23), Max: 37.7 (23 Dec 2018 21:26)  T(F): 99.5 (24 Dec 2018 05:23), Max: 99.9 (23 Dec 2018 21:26)  HR: 111 (24 Dec 2018 05:23) (64 - 153)  BP: 116/64 (24 Dec 2018 05:23) (106/58 - 133/78)  BP(mean): --  RR: 16 (24 Dec 2018 05:23) (16 - 18)  SpO2: 96% (24 Dec 2018 05:23) (92% - 99%)  CAPILLARY BLOOD GLUCOSE        I&O's Summary      PHYSICAL EXAM:  GENERAL: NAD  HEAD:  Atraumatic, Normocephalic  EYES: EOMI, PERRLA, conjunctiva and sclera clear  NECK: Supple, No JVD  CHEST/LUNG: Clear to auscultation bilaterally; No wheeze  HEART: irregular rhythm, No rubs, or gallops  ABDOMEN: Soft, Nontender, Nondistended; Bowel sounds present  EXTREMITIES:  2+ Peripheral Pulses, No clubbing, cyanosis  PSYCH: Calm  NEUROLOGY: awake, alert, non-focal neurological exam  SKIN: Intact    LABS:                    RADIOLOGY & ADDITIONAL TESTS:    Imaging Personally Reviewed:    Care Discussed with Consultants/Other Providers:    Care Discussed with primary team. CC: F/U for tachycardia    SUBJECTIVE / OVERNIGHT EVENTS:  Patient unable to make her needs known.  No specific discomfort noted this morning.  No overnight issues with F/C, N/V, CP, SOB, Cough, lightheadedness, dizziness, abdominal pain, diarrhea, dysuria.    MEDICATIONS  (STANDING):  diltiazem    Tablet 90 milliGRAM(s) Oral every 6 hours  docusate sodium 300 milliGRAM(s) Oral daily  latanoprost 0.005% Ophthalmic Solution 1 Drop(s) Both EYES at bedtime  metoprolol tartrate 50 milliGRAM(s) Oral two times a day  mirtazapine 7.5 milliGRAM(s) Oral every 12 hours  rivaroxaban 10 milliGRAM(s) Oral every 24 hours  senna 2 Tablet(s) Oral at bedtime    MEDICATIONS  (PRN):  acetaminophen   Tablet .. 650 milliGRAM(s) Oral every 8 hours PRN Temp greater or equal to 38C (100.4F), Mild Pain (1 - 3)  oxyCODONE    IR 2.5 milliGRAM(s) Oral every 4 hours PRN breakthrough  traMADol 25 milliGRAM(s) Oral every 6 hours PRN Moderate Pain (4 - 6)  traMADol 50 milliGRAM(s) Oral every 6 hours PRN Severe Pain (7 - 10)      Vital Signs Last 24 Hrs  T(C): 37.5 (24 Dec 2018 05:23), Max: 37.7 (23 Dec 2018 21:26)  T(F): 99.5 (24 Dec 2018 05:23), Max: 99.9 (23 Dec 2018 21:26)  HR: 111 (24 Dec 2018 05:23) (64 - 153)  BP: 116/64 (24 Dec 2018 05:23) (106/58 - 133/78)  BP(mean): --  RR: 16 (24 Dec 2018 05:23) (16 - 18)  SpO2: 96% (24 Dec 2018 05:23) (92% - 99%)  CAPILLARY BLOOD GLUCOSE        I&O's Summary      PHYSICAL EXAM:  GENERAL: NAD  HEAD:  Atraumatic, Normocephalic  EYES: EOMI, PERRLA, conjunctiva and sclera clear  NECK: Supple, No JVD  CHEST/LUNG: Clear to auscultation bilaterally; No wheeze  HEART: irregular rhythm, No rubs, or gallops  ABDOMEN: Soft, Nontender, Nondistended; Bowel sounds present  EXTREMITIES:  2+ Peripheral Pulses, No clubbing, cyanosis  PSYCH: Calm  NEUROLOGY: awake, alert, non-focal neurological exam  SKIN: Intact    LABS:                        9.2    8.53  )-----------( 279      ( 24 Dec 2018 10:00 )             29.5                     RADIOLOGY & ADDITIONAL TESTS:    Imaging Personally Reviewed:    Care Discussed with Consultants/Other Providers:    Care Discussed with primary team.

## 2018-12-24 NOTE — PROVIDER CONTACT NOTE (OTHER) - SITUATION
Pt Rob adrián room [id number]- heart rate varying between 100 and 160, lopressor and cardziem given between 930 and 11pm. HR still elevated and varying.

## 2018-12-24 NOTE — PROGRESS NOTE ADULT - SUBJECTIVE AND OBJECTIVE BOX
Subjective: Patient seen and examined. No new events except as noted.     SUBJECTIVE/ROS:        MEDICATIONS:  MEDICATIONS  (STANDING):  diltiazem    Tablet 90 milliGRAM(s) Oral every 6 hours  docusate sodium 300 milliGRAM(s) Oral daily  latanoprost 0.005% Ophthalmic Solution 1 Drop(s) Both EYES at bedtime  metoprolol tartrate 50 milliGRAM(s) Oral two times a day  mirtazapine 7.5 milliGRAM(s) Oral every 12 hours  rivaroxaban 10 milliGRAM(s) Oral every 24 hours  senna 2 Tablet(s) Oral at bedtime      PHYSICAL EXAM:  T(C): 37.5 (12-24-18 @ 05:23), Max: 37.7 (12-23-18 @ 21:26)  HR: 111 (12-24-18 @ 05:23) (64 - 153)  BP: 116/64 (12-24-18 @ 05:23) (106/58 - 133/78)  RR: 16 (12-24-18 @ 05:23) (16 - 18)  SpO2: 96% (12-24-18 @ 05:23) (92% - 99%)  Wt(kg): --  I&O's Summary      JVP: Normal  Neck: supple  Lung: clear   CV: S1 S2 , Murmur:  Abd: soft  Ext: No edema  neuro: Awake  Psych: flat affect  Skin: normal      LABS/DATA:    CARDIAC MARKERS:                  proBNP:   Lipid Profile:   HgA1c:   TSH:     TELE:  EKG:

## 2018-12-24 NOTE — PROGRESS NOTE ADULT - PROBLEM SELECTOR PLAN 1
complicated by RVR. Off Cardizem gtt now.  Continue Cardizem PO and Lopressor PO.  Titrate up Lopressor.  Appreciate Cardiology consultation.  At risk for CHF, monitor fluid status carefully. Check H/H to rule out anemia related RVR.  rate controlled now complicated by RVR. Off Cardizem gtt now.  Continue Cardizem PO and Lopressor PO.  Titrate up Lopressor for evening dose if HR continues to be in 100s range.  Appreciate Cardiology consultation.  At risk for CHF, monitor fluid status carefully. Check H/H to rule out anemia related RVR.  rate controlled now complicated by RVR. Off Cardizem gtt now.  Continue Cardizem PO and Lopressor PO.  Titrate up Lopressor for evening dose if HR continues to be in 100s range.  Appreciate Cardiology consultation.  At risk for CHF, monitor fluid status carefully.

## 2018-12-25 LAB
APPEARANCE UR: CLEAR — SIGNIFICANT CHANGE UP
BACTERIA # UR AUTO: SIGNIFICANT CHANGE UP
BILIRUB UR-MCNC: NEGATIVE — SIGNIFICANT CHANGE UP
BLOOD UR QL VISUAL: NEGATIVE — SIGNIFICANT CHANGE UP
BUN SERPL-MCNC: 31 MG/DL — HIGH (ref 7–23)
CALCIUM SERPL-MCNC: 9.4 MG/DL — SIGNIFICANT CHANGE UP (ref 8.4–10.5)
CHLORIDE SERPL-SCNC: 102 MMOL/L — SIGNIFICANT CHANGE UP (ref 98–107)
CO2 SERPL-SCNC: 27 MMOL/L — SIGNIFICANT CHANGE UP (ref 22–31)
COLOR SPEC: SIGNIFICANT CHANGE UP
CREAT SERPL-MCNC: 0.59 MG/DL — SIGNIFICANT CHANGE UP (ref 0.5–1.3)
GLUCOSE SERPL-MCNC: 121 MG/DL — HIGH (ref 70–99)
GLUCOSE UR-MCNC: NEGATIVE — SIGNIFICANT CHANGE UP
HCT VFR BLD CALC: 33.3 % — LOW (ref 34.5–45)
HGB BLD-MCNC: 10.4 G/DL — LOW (ref 11.5–15.5)
HYALINE CASTS # UR AUTO: SIGNIFICANT CHANGE UP
KETONES UR-MCNC: SIGNIFICANT CHANGE UP
LEUKOCYTE ESTERASE UR-ACNC: SIGNIFICANT CHANGE UP
MAGNESIUM SERPL-MCNC: 1.9 MG/DL — SIGNIFICANT CHANGE UP (ref 1.6–2.6)
MCHC RBC-ENTMCNC: 29.4 PG — SIGNIFICANT CHANGE UP (ref 27–34)
MCHC RBC-ENTMCNC: 31.2 % — LOW (ref 32–36)
MCV RBC AUTO: 94.1 FL — SIGNIFICANT CHANGE UP (ref 80–100)
MUCOUS THREADS # UR AUTO: SIGNIFICANT CHANGE UP
NITRITE UR-MCNC: NEGATIVE — SIGNIFICANT CHANGE UP
NRBC # FLD: 0.07 — SIGNIFICANT CHANGE UP
NT-PROBNP SERPL-SCNC: 1677 PG/ML — SIGNIFICANT CHANGE UP
PH UR: 5.5 — SIGNIFICANT CHANGE UP (ref 5–8)
PHOSPHATE SERPL-MCNC: 4 MG/DL — SIGNIFICANT CHANGE UP (ref 2.5–4.5)
PLATELET # BLD AUTO: 364 K/UL — SIGNIFICANT CHANGE UP (ref 150–400)
PMV BLD: 10.8 FL — SIGNIFICANT CHANGE UP (ref 7–13)
POTASSIUM SERPL-MCNC: 3.7 MMOL/L — SIGNIFICANT CHANGE UP (ref 3.5–5.3)
POTASSIUM SERPL-SCNC: 3.7 MMOL/L — SIGNIFICANT CHANGE UP (ref 3.5–5.3)
PROT UR-MCNC: NEGATIVE — SIGNIFICANT CHANGE UP
RBC # BLD: 3.54 M/UL — LOW (ref 3.8–5.2)
RBC # FLD: 14.8 % — HIGH (ref 10.3–14.5)
RBC CASTS # UR COMP ASSIST: SIGNIFICANT CHANGE UP (ref 0–?)
SODIUM SERPL-SCNC: 144 MMOL/L — SIGNIFICANT CHANGE UP (ref 135–145)
SP GR SPEC: 1.01 — SIGNIFICANT CHANGE UP (ref 1–1.04)
SQUAMOUS # UR AUTO: SIGNIFICANT CHANGE UP
UROBILINOGEN FLD QL: NORMAL — SIGNIFICANT CHANGE UP
WBC # BLD: 15.04 K/UL — HIGH (ref 3.8–10.5)
WBC # FLD AUTO: 15.04 K/UL — HIGH (ref 3.8–10.5)
WBC UR QL: >50 — HIGH (ref 0–?)

## 2018-12-25 PROCEDURE — 93010 ELECTROCARDIOGRAM REPORT: CPT

## 2018-12-25 PROCEDURE — 71275 CT ANGIOGRAPHY CHEST: CPT | Mod: 26

## 2018-12-25 PROCEDURE — 71045 X-RAY EXAM CHEST 1 VIEW: CPT | Mod: 26

## 2018-12-25 PROCEDURE — 99233 SBSQ HOSP IP/OBS HIGH 50: CPT

## 2018-12-25 RX ORDER — ONDANSETRON 8 MG/1
4 TABLET, FILM COATED ORAL ONCE
Qty: 0 | Refills: 0 | Status: COMPLETED | OUTPATIENT
Start: 2018-12-25 | End: 2018-12-25

## 2018-12-25 RX ORDER — METOPROLOL TARTRATE 50 MG
2.5 TABLET ORAL EVERY 6 HOURS
Qty: 0 | Refills: 0 | Status: DISCONTINUED | OUTPATIENT
Start: 2018-12-25 | End: 2018-12-25

## 2018-12-25 RX ORDER — CEFTRIAXONE 500 MG/1
1 INJECTION, POWDER, FOR SOLUTION INTRAMUSCULAR; INTRAVENOUS EVERY 24 HOURS
Qty: 0 | Refills: 0 | Status: DISCONTINUED | OUTPATIENT
Start: 2018-12-26 | End: 2018-12-26

## 2018-12-25 RX ORDER — CEFTRIAXONE 500 MG/1
INJECTION, POWDER, FOR SOLUTION INTRAMUSCULAR; INTRAVENOUS
Qty: 0 | Refills: 0 | Status: DISCONTINUED | OUTPATIENT
Start: 2018-12-25 | End: 2018-12-26

## 2018-12-25 RX ORDER — METOPROLOL TARTRATE 50 MG
2.5 TABLET ORAL ONCE
Qty: 0 | Refills: 0 | Status: COMPLETED | OUTPATIENT
Start: 2018-12-25 | End: 2018-12-25

## 2018-12-25 RX ORDER — HALOPERIDOL DECANOATE 100 MG/ML
2 INJECTION INTRAMUSCULAR EVERY 6 HOURS
Qty: 0 | Refills: 0 | Status: DISCONTINUED | OUTPATIENT
Start: 2018-12-25 | End: 2018-12-28

## 2018-12-25 RX ORDER — ACETAMINOPHEN 500 MG
650 TABLET ORAL EVERY 6 HOURS
Qty: 0 | Refills: 0 | Status: DISCONTINUED | OUTPATIENT
Start: 2018-12-25 | End: 2018-12-25

## 2018-12-25 RX ORDER — HALOPERIDOL DECANOATE 100 MG/ML
2 INJECTION INTRAMUSCULAR ONCE
Qty: 0 | Refills: 0 | Status: COMPLETED | OUTPATIENT
Start: 2018-12-25 | End: 2018-12-25

## 2018-12-25 RX ORDER — METOPROLOL TARTRATE 50 MG
5 TABLET ORAL EVERY 6 HOURS
Qty: 0 | Refills: 0 | Status: DISCONTINUED | OUTPATIENT
Start: 2018-12-25 | End: 2018-12-26

## 2018-12-25 RX ORDER — DILTIAZEM HCL 120 MG
15 CAPSULE, EXT RELEASE 24 HR ORAL
Qty: 125 | Refills: 0 | Status: DISCONTINUED | OUTPATIENT
Start: 2018-12-25 | End: 2018-12-26

## 2018-12-25 RX ORDER — ACETAMINOPHEN 500 MG
650 TABLET ORAL EVERY 6 HOURS
Qty: 0 | Refills: 0 | Status: DISCONTINUED | OUTPATIENT
Start: 2018-12-25 | End: 2018-12-28

## 2018-12-25 RX ORDER — AZITHROMYCIN 500 MG/1
500 TABLET, FILM COATED ORAL DAILY
Qty: 0 | Refills: 0 | Status: DISCONTINUED | OUTPATIENT
Start: 2018-12-25 | End: 2018-12-25

## 2018-12-25 RX ORDER — METOPROLOL TARTRATE 50 MG
5 TABLET ORAL ONCE
Qty: 0 | Refills: 0 | Status: COMPLETED | OUTPATIENT
Start: 2018-12-25 | End: 2018-12-25

## 2018-12-25 RX ORDER — DILTIAZEM HCL 120 MG
5 CAPSULE, EXT RELEASE 24 HR ORAL
Qty: 125 | Refills: 0 | Status: DISCONTINUED | OUTPATIENT
Start: 2018-12-25 | End: 2018-12-25

## 2018-12-25 RX ORDER — PIPERACILLIN AND TAZOBACTAM 4; .5 G/20ML; G/20ML
3.38 INJECTION, POWDER, LYOPHILIZED, FOR SOLUTION INTRAVENOUS ONCE
Qty: 0 | Refills: 0 | Status: COMPLETED | OUTPATIENT
Start: 2018-12-25 | End: 2018-12-25

## 2018-12-25 RX ORDER — FUROSEMIDE 40 MG
20 TABLET ORAL ONCE
Qty: 0 | Refills: 0 | Status: COMPLETED | OUTPATIENT
Start: 2018-12-25 | End: 2018-12-25

## 2018-12-25 RX ORDER — SODIUM CHLORIDE 9 MG/ML
1000 INJECTION, SOLUTION INTRAVENOUS
Qty: 0 | Refills: 0 | Status: DISCONTINUED | OUTPATIENT
Start: 2018-12-25 | End: 2018-12-26

## 2018-12-25 RX ORDER — PIPERACILLIN AND TAZOBACTAM 4; .5 G/20ML; G/20ML
3.38 INJECTION, POWDER, LYOPHILIZED, FOR SOLUTION INTRAVENOUS EVERY 8 HOURS
Qty: 0 | Refills: 0 | Status: DISCONTINUED | OUTPATIENT
Start: 2018-12-25 | End: 2018-12-27

## 2018-12-25 RX ORDER — HALOPERIDOL DECANOATE 100 MG/ML
1 INJECTION INTRAMUSCULAR EVERY 6 HOURS
Qty: 0 | Refills: 0 | Status: DISCONTINUED | OUTPATIENT
Start: 2018-12-25 | End: 2018-12-25

## 2018-12-25 RX ORDER — KETOROLAC TROMETHAMINE 30 MG/ML
15 SYRINGE (ML) INJECTION ONCE
Qty: 0 | Refills: 0 | Status: DISCONTINUED | OUTPATIENT
Start: 2018-12-25 | End: 2018-12-25

## 2018-12-25 RX ORDER — DILTIAZEM HCL 120 MG
15 CAPSULE, EXT RELEASE 24 HR ORAL
Qty: 125 | Refills: 0 | Status: DISCONTINUED | OUTPATIENT
Start: 2018-12-25 | End: 2018-12-25

## 2018-12-25 RX ORDER — CEFTRIAXONE 500 MG/1
1 INJECTION, POWDER, FOR SOLUTION INTRAMUSCULAR; INTRAVENOUS ONCE
Qty: 0 | Refills: 0 | Status: COMPLETED | OUTPATIENT
Start: 2018-12-25 | End: 2018-12-25

## 2018-12-25 RX ADMIN — CEFTRIAXONE 100 GRAM(S): 500 INJECTION, POWDER, FOR SOLUTION INTRAMUSCULAR; INTRAVENOUS at 15:50

## 2018-12-25 RX ADMIN — Medication 15 MG/HR: at 15:00

## 2018-12-25 RX ADMIN — Medication 5 MILLIGRAM(S): at 13:59

## 2018-12-25 RX ADMIN — PIPERACILLIN AND TAZOBACTAM 200 GRAM(S): 4; .5 INJECTION, POWDER, LYOPHILIZED, FOR SOLUTION INTRAVENOUS at 16:19

## 2018-12-25 RX ADMIN — Medication 20 MILLIGRAM(S): at 09:59

## 2018-12-25 RX ADMIN — Medication 10 MG/HR: at 13:24

## 2018-12-25 RX ADMIN — Medication 2.5 MILLIGRAM(S): at 08:11

## 2018-12-25 RX ADMIN — HALOPERIDOL DECANOATE 2 MILLIGRAM(S): 100 INJECTION INTRAMUSCULAR at 17:35

## 2018-12-25 RX ADMIN — LATANOPROST 1 DROP(S): 0.05 SOLUTION/ DROPS OPHTHALMIC; TOPICAL at 21:54

## 2018-12-25 RX ADMIN — Medication 5 MILLIGRAM(S): at 10:04

## 2018-12-25 RX ADMIN — Medication 2.5 MILLIGRAM(S): at 09:40

## 2018-12-25 RX ADMIN — SODIUM CHLORIDE 50 MILLILITER(S): 9 INJECTION, SOLUTION INTRAVENOUS at 19:29

## 2018-12-25 RX ADMIN — HALOPERIDOL DECANOATE 1 MILLIGRAM(S): 100 INJECTION INTRAMUSCULAR at 17:25

## 2018-12-25 RX ADMIN — Medication 5 MILLIGRAM(S): at 15:50

## 2018-12-25 RX ADMIN — Medication 15 MG/HR: at 19:29

## 2018-12-25 RX ADMIN — ONDANSETRON 4 MILLIGRAM(S): 8 TABLET, FILM COATED ORAL at 19:42

## 2018-12-25 RX ADMIN — Medication 15 MILLIGRAM(S): at 15:01

## 2018-12-25 RX ADMIN — Medication 650 MILLIGRAM(S): at 17:00

## 2018-12-25 RX ADMIN — Medication 15 MILLIGRAM(S): at 15:16

## 2018-12-25 RX ADMIN — Medication 5 MILLIGRAM(S): at 18:43

## 2018-12-25 RX ADMIN — Medication 650 MILLIGRAM(S): at 15:34

## 2018-12-25 NOTE — PROGRESS NOTE ADULT - PROBLEM SELECTOR PLAN 1
complicated by RVR.  Was off Cardizem gtt now. As per staff pt spitting po  Cardizem  and Lopressor   case d/w ortho and cardiology, will check CTPA to r/o PE and PNA   pt was loaded with cardizem  20 mg and started on drip   Appreciate Cardiology f/u   At risk for CHF, monitor fluid status carefully. Pt given 20 mg ov IV lasix x1  labs reviewed , leucocytosis ,BNP 1677, if CT chest shows PNa, will start on Abx for possible aspiration PNA

## 2018-12-25 NOTE — PROGRESS NOTE ADULT - SUBJECTIVE AND OBJECTIVE BOX
Ortho Progress Note    S: Patient seen and examined.  No acute events overnight    T(C): 36.3 (12-25-18 @ 05:27), Max: 36.9 (12-25-18 @ 01:00)  HR: 118 (12-25-18 @ 05:27) (97 - 118)  BP: 137/77 (12-25-18 @ 05:27) (116/54 - 137/77)  RR: 18 (12-25-18 @ 05:27) (17 - 18)  SpO2: 93% (12-25-18 @ 05:27) (91% - 95%)  Wt(kg): --                          9.2    8.53  )-----------( 279      ( 24 Dec 2018 10:00 )             29.5     12-24    144  |  104  |  28<H>  ----------------------------<  108<H>  4.0   |  28  |  0.54    Ca    9.1      24 Dec 2018 10:00          O:  Physical Exam:  Gen: Laying in bed, NAD, alert    Resp: Unlabored breathing  Ext: Moving right foot, motor and sensory exam limited secondary to underlying dementia, +1 DP pulse, extremity warm, brisk cap refill  Incision: c/d/i, no erythema or edema      A/P 89yo F s/p right hip IMN POD #7    Pain Control  DVT PPX  PT/OOB  WBAT   c/w cardizem/lopressor  Telemetry  Dispo planning    Ortho [id number]

## 2018-12-25 NOTE — CONSULT NOTE ADULT - SUBJECTIVE AND OBJECTIVE BOX
HISTORY OF PRESENT ILLNESS:    89F Afib on xarelto s/p PPM, Dementia presents with unwitnessed fall - suspect syncope, Rt femur fx - s/p ORIF on 12/18 complicated by post-op leukocytosis, anemia, Afib with RVR, acute encephalopathy. Pt seen on the floor in Afib, max HR in 170s, somehwat altered and on non rebreather. Cardizem 20mg was given and with cardizem gtt, she entered a sinus rhythm. Two hours later pt became more hypoxic, saturating 91% on non rebreather. She has a rising white count, afebrile, no productive cough or chest pain. Mental status A/O-1-2. Bedside ultrasound did not display evidence of pulmonary edema, although she did receive 20mg Lasix prior to scan. Pt to be transported to CT scan for CT angio chest, and come directly to SICU afterwards for acute hypoxic respiratory failure 2/2 possible aspiration PNA, less likely PE since she has been on xarelto. Family has been spoken to about DNR/DNI, team to revist goals of care plans with daughter today.     PAST MEDICAL HISTORY: Varicose Veins of Lower Extremities  MVP (Mitral Valve Prolapse)  Atrial Fibrillation  Hyperlipidemia  IBS (Irritable Bowel Syndrome)      PAST SURGICAL HISTORY: Abdominal Adhesions  S/P Tonsillectomy      FAMILY HISTORY:     SOCIAL HISTORY:    CODE STATUS: Full Code    HOME MEDICATIONS: cardizem, colace, coreg, keppra, remeron, xarelto     ALLERGIES: No Known Allergies      VITAL SIGNS:  ICU Vital Signs Last 24 Hrs  T(C): 36.9 (25 Dec 2018 13:22), Max: 36.9 (25 Dec 2018 01:00)  T(F): 98.5 (25 Dec 2018 13:22), Max: 98.5 (25 Dec 2018 13:22)  HR: 135 (25 Dec 2018 13:22) (97 - 178)  BP: 125/74 (25 Dec 2018 13:22) (116/54 - 137/77)  BP(mean): 87 (25 Dec 2018 09:14) (87 - 87)  ABP: --  ABP(mean): --  RR: 20 (25 Dec 2018 13:22) (17 - 38)  SpO2: 90% (25 Dec 2018 13:22) (78% - 94%)      NEURO  Exam: A/Ox 2-3, nonfocal neurological deficits   Meds:acetaminophen   Tablet .. 650 milliGRAM(s) Oral every 8 hours PRN Temp greater or equal to 38C (100.4F), Mild Pain (1 - 3)  acetaminophen   Tablet .. 650 milliGRAM(s) Oral every 6 hours PRN Mild Pain (1 - 3)  mirtazapine 7.5 milliGRAM(s) Oral every 12 hours      RESPIRATORY  Saturating 91% on NRB. Lungs coarse left at the base      CARDIOVASCULAR  Cardiac Rhythm: Afib, rate controlled   Meds:diltiazem Infusion 10 mG/Hr IV Continuous <Continuous>  metoprolol tartrate Injectable 5 milliGRAM(s) IV Push once      GI/NUTRITION  Exam: Abd soft non tender, non distended  Diet: NPO  Meds:docusate sodium 300 milliGRAM(s) Oral daily  senna 2 Tablet(s) Oral at bedtime      GENITOURINARY/RENAL  Meds:dextrose 5% + sodium chloride 0.45%. 1000 milliLiter(s) IV Continuous <Continuous>        12-25    144  |  102  |  31<H>  ----------------------------<  121<H>  3.7   |  27  |  0.59    Ca    9.4      25 Dec 2018 11:32  Phos  4.0     12-25  Mg     1.9     12-25      [ ] Martinez catheter, indication: urine output monitoring in critically ill patient    HEMATOLOGIC  [ ] VTE Prophylaxis:  rivaroxaban 10 milliGRAM(s) Oral every 24 hours                          10.4   15.04 )-----------( 364      ( 25 Dec 2018 11:32 )             33.3       Transfusion: [ ] PRBC	[ ] Platelets	[ ] FFP	[ ] Cryoprecipitate      INFECTIOUS DISEASES  Meds:  RECENT CULTURES:      ENDOCRINE  Meds:  CAPILLARY BLOOD GLUCOSE          PATIENT CARE ACCESS DEVICES:  [x ] Peripheral IV  [ ] Central Venous Line	[ ] R	[ ] L	[ ] IJ	[ ] Fem	[ ] SC	Placed:   [ ] Arterial Line		[ ] R	[ ] L	[ ] Fem	[ ] Rad	[ ] Ax	Placed:   [ ] PICC:					[ ] Mediport  [ ] Urinary Catheter, Date Placed:   [x] Necessity of urinary, arterial, and venous catheters discussed    OTHER MEDICATIONS: latanoprost 0.005% Ophthalmic Solution 1 Drop(s) Both EYES at bedtime      IMAGING STUDIES:

## 2018-12-25 NOTE — PROVIDER CONTACT NOTE (MEDICATION) - ASSESSMENT
pt asleep. Pt lethargic. VSS. AOx1 Pt HOB 35-40 degrees. Pt refusing medication after many pt asleep. Pt lethargic. VSS. AOx1 Pt HOB 35-40 degrees. Pt refusing medication after many attempts. pt spit medications out of mouth.

## 2018-12-25 NOTE — PROGRESS NOTE ADULT - ASSESSMENT
89F Afib on xarelto s/p PPM, Dementia presents with unwitnessed fall - suspect syncope, Rt femur fx - s/p ORIF on 12/18 complicated by post-op leukocytosis, anemia, Afib with RVR, acute encephalopathy, admitted to SICU with Acute Hypoxic Respiratory Failure with Afib with RVR.     Neuro  -history of seizures, was on keppra, now withheld given it was likely an episode of syncope resulting from afib? Holding Remeron given a decline in mental status  -tylenol prn for pain control   -consider post-op delerium vs metabolic encephalopthy in a setting of sepsis 2/2 asp pna    Resp  -saturating >90% on Non rebreather, wean as tolerated  -CXR displayed trace bilateral pleural effusions  -revisit plans with daughter for possible DNI status  -Pending CT angio chest for possible Asp PNA, r/o PE    Cardiac  -Afib w/ RVR,   -Cont cardizem gtt with HR goal 110  -vitals stable     GI  -NPO for now given history of aspiration   -bowel regimen of senna and colace    Renal  -Bladder scan at bedside did not show signs of retention  maintenance fluids at 50ml/hr    ID  -Cont ceftriaxone and zosyn for HCAP  -f/u urine culture, blood cx    Heme  -c/w xarelto fro Afib

## 2018-12-25 NOTE — PROGRESS NOTE ADULT - ASSESSMENT
afib with RVR  acute hypoxic respiratory disease     suspect aspiration PNA  would get CTPA rule out PE and eval for PNA  full labs  o2  increase cardizem to 10/hr

## 2018-12-25 NOTE — PROGRESS NOTE ADULT - SUBJECTIVE AND OBJECTIVE BOX
Patient is a 89y old  Female who presents with a chief complaint of R hip pain (25 Dec 2018 11:06)      SUBJECTIVE / OVERNIGHT EVENTS: patient seen and examined by bedside at 10 AM ,in formed by ortho that pt tachycardic and hypoxic on RA, pt awake but confused   pt was given lopressor but heart rate still high , pt started on Cardizem drip     MEDICATIONS  (STANDING):  diltiazem Infusion 10 mG/Hr (10 mL/Hr) IV Continuous <Continuous>  docusate sodium 300 milliGRAM(s) Oral daily  latanoprost 0.005% Ophthalmic Solution 1 Drop(s) Both EYES at bedtime  mirtazapine 7.5 milliGRAM(s) Oral every 12 hours  rivaroxaban 10 milliGRAM(s) Oral every 24 hours  senna 2 Tablet(s) Oral at bedtime    MEDICATIONS  (PRN):  acetaminophen   Tablet .. 650 milliGRAM(s) Oral every 8 hours PRN Temp greater or equal to 38C (100.4F), Mild Pain (1 - 3)  acetaminophen   Tablet .. 650 milliGRAM(s) Oral every 6 hours PRN Mild Pain (1 - 3)      Vital Signs Last 24 Hrs  T(C): 36.4 (25 Dec 2018 07:30), Max: 36.9 (25 Dec 2018 01:00)  T(F): 97.5 (25 Dec 2018 07:30), Max: 98.4 (25 Dec 2018 01:00)  HR: 178 (25 Dec 2018 09:14) (97 - 178)  BP: 126/76 (25 Dec 2018 09:14) (116/54 - 137/77)  BP(mean): 87 (25 Dec 2018 09:14) (87 - 87)  RR: 38 (25 Dec 2018 09:14) (17 - 38)  SpO2: 78% (25 Dec 2018 09:14) (78% - 94%)  CAPILLARY BLOOD GLUCOSE        I&O's Summary    PHYSICAL EXAM:  GENERAL: frail elderly female lying in bed,   HEAD:  Atraumatic, Normocephalic  EYES: EOMI, PERRLA, conjunctiva and sclera clear  NECK: Supple,   CHEST/LUNG: Clear to auscultation bilaterally; No wheeze  HEART: irregular rhythm, tachycardic   ABDOMEN: Soft, Nontender, Nondistended; Bowel sounds present  EXTREMITIES:  2+ Peripheral Pulses, No clubbing, cyanosis, Rt hip incision C/D/I   PSYCH: Calm, anxious   NEUROLOGY: awake, alert, non-focal neurological exam      LABS:                        10.4   15.04 )-----------( 364      ( 25 Dec 2018 11:32 )             33.3     12-25    144  |  102  |  31<H>  ----------------------------<  121<H>  3.7   |  27  |  0.59    Ca    9.4      25 Dec 2018 11:32  Phos  4.0     12-25  Mg     1.9     12-25                RADIOLOGY & ADDITIONAL TESTS:  < from: US Duplex Venous Lower Ext Complete, Bilateral (12.24.18 @ 10:55) >  There is normal compressibility of the bilateral common femoral, femoral   and popliteal veins. No calf vein thrombosis is detected.    Doppler examination shows normal spontaneous and phasic flow.    IMPRESSION:     No evidence of bilateral lower extremity deep venous thrombosis.        < end of copied text >    Imaging Personally Reviewed:    Consultant(s) Notes Reviewed:  ortho, cardiology     Care Discussed with Consultants/Other Providers: cardiology, ortho

## 2018-12-25 NOTE — PROGRESS NOTE ADULT - SUBJECTIVE AND OBJECTIVE BOX
Subjective: Patient seen and examined. No new events except as noted.     SUBJECTIVE/ROS:  confused       MEDICATIONS:  MEDICATIONS  (STANDING):  diltiazem Infusion 5 mG/Hr (5 mL/Hr) IV Continuous <Continuous>  docusate sodium 300 milliGRAM(s) Oral daily  latanoprost 0.005% Ophthalmic Solution 1 Drop(s) Both EYES at bedtime  mirtazapine 7.5 milliGRAM(s) Oral every 12 hours  rivaroxaban 10 milliGRAM(s) Oral every 24 hours  senna 2 Tablet(s) Oral at bedtime      PHYSICAL EXAM:  T(C): 36.4 (12-25-18 @ 07:30), Max: 36.9 (12-25-18 @ 01:00)  HR: 178 (12-25-18 @ 09:14) (97 - 178)  BP: 126/76 (12-25-18 @ 09:14) (116/54 - 137/77)  RR: 38 (12-25-18 @ 09:14) (17 - 38)  SpO2: 78% (12-25-18 @ 09:14) (78% - 95%)  Wt(kg): --  I&O's Summary        JVP: Normal  Neck: supple  Lung: rhonchi on left side   CV: S1 S2 , Murmur:  Abd: soft  Ext: No edema  neuro: Awake rt  Psych: flat affect  Skin: normal        LABS/DATA:    CARDIAC MARKERS:                                9.2    8.53  )-----------( 279      ( 24 Dec 2018 10:00 )             29.5     12-24    144  |  104  |  28<H>  ----------------------------<  108<H>  4.0   |  28  |  0.54    Ca    9.1      24 Dec 2018 10:00      proBNP:   Lipid Profile:   HgA1c:   TSH:     TELE:  EKG:

## 2018-12-25 NOTE — PROGRESS NOTE ADULT - PROBLEM SELECTOR PLAN 2
Very high risk for post-op delirium. Avoid benzodiazepine, anticholinergics.  Minimize use of opioids.  Resume Risperdal at her home dose.  Monitor QTc

## 2018-12-25 NOTE — PROGRESS NOTE ADULT - SUBJECTIVE AND OBJECTIVE BOX
HISTORY  89F Afib on xarelto s/p PPM, Dementia presents with unwitnessed fall - suspect syncope, Rt femur fx - s/p ORIF on 12/18 complicated by post-op leukocytosis, anemia, Afib with RVR, acute encephalopathy. Pt seen on the floor in Afib, max HR in 170s, somehwat altered and on non rebreather. Cardizem 20mg was given and with cardizem gtt, she entered a sinus rhythm. Two hours later pt became more hypoxic, saturating 91% on non rebreather. She has a rising white count, afebrile, no productive cough or chest pain. Mental status A/O-1-2. Bedside ultrasound did not display evidence of pulmonary edema, although she did receive 20mg Lasix prior to scan. Pt to be transported to CT scan for CT angio chest, and come directly to SICU afterwards for acute hypoxic respiratory failure 2/2 possible aspiration PNA, less likely PE since she has been on xarelto. Family has been spoken to about DNR/DNI, team to revist goals of care plans with daughter today.       24 HOUR EVENTS: No acute events overnight. Patient is maxed on cardizem gtt and on 5 lopressor q6 afib to the 150s, down to 110s on face mask O2. Patient A&Ox2 good urine output. Patient pressures were soft to 80s/50s, given 250 albumin bolus with good response.    SUBJECTIVE/ROS:  [x] A ten-point review of systems was otherwise negative except as noted.  [x] Due to altered mental status/intubation, subjective information were not able to be obtained from the patient. History was obtained, to the extent possible, from review of the chart and collateral sources of information.      NEURO  GCS:  15     Exam: awake, alert  Meds: acetaminophen  Suppository .. 650 milliGRAM(s) Rectal every 6 hours PRN Temp greater or equal to 38C (100.4F), Mild Pain (1 - 3)  haloperidol    Injectable 2 milliGRAM(s) IV Push every 6 hours PRN agitation    [x] Adequacy of sedation and pain control has been assessed and adjusted      RESPIRATORY  RR: 24 (12-25-18 @ 23:00) (17 - 38)  SpO2: 100% (12-25-18 @ 23:00) (78% - 100%)  Exam: unlabored, clear to auscultation bilaterally  Mechanical Ventilation:     [N/A] Extubation Readiness Assessed  Meds:       CARDIOVASCULAR  HR: 124 (12-25-18 @ 23:00) (102 - 178)  BP: 103/68 (12-25-18 @ 23:00) (95/54 - 148/111)  BP(mean): 74 (12-25-18 @ 23:00) (60 - 121)        Exam: regular rate and rhythm  Cardiac Rhythm: sinus  Perfusion     [x]Adequate   [ ]Inadequate  Mentation   [x]Normal       [ ]Reduced  Extremities  [x]Warm         [ ]Cool  Volume Status [ ]Hypervolemic [x]Euvolemic [ ]Hypovolemic  Meds: diltiazem Infusion 15 mG/Hr IV Continuous <Continuous>  metoprolol tartrate Injectable 5 milliGRAM(s) IV Push every 6 hours PRN HR>110        GI/NUTRITION  Exam: soft, nontender, nondistended  Diet: NPO  Meds: docusate sodium 300 milliGRAM(s) Oral daily  senna 2 Tablet(s) Oral at bedtime      GENITOURINARY  I&O's Detail    12-25 @ 07:01  -  12-26 @ 00:00  --------------------------------------------------------  IN:    dextrose 5% + sodium chloride 0.45%.: 400 mL    diltiazem Infusion: 60 mL    diltiazem Infusion: 75 mL    IV PiggyBack: 150 mL  Total IN: 685 mL    OUT:    Intermittent Catheterization - Urethral: 400 mL  Total OUT: 400 mL    Total NET: 285 mL        Weight (kg): 67.4 (12-25 @ 14:52)  12-25    144  |  102  |  31<H>  ----------------------------<  121<H>  3.7   |  27  |  0.59    Ca    9.4      25 Dec 2018 11:32  Phos  4.0     12-25  Mg     1.9     12-25      [ ] Martinez catheter, indication: N/A  Meds: dextrose 5% + sodium chloride 0.45%. 1000 milliLiter(s) IV Continuous <Continuous>        HEMATOLOGIC  Meds: rivaroxaban 10 milliGRAM(s) Oral every 24 hours    [x] VTE Prophylaxis                        10.4   15.04 )-----------( 364      ( 25 Dec 2018 11:32 )             33.3       Transfusion     [ ] PRBC   [ ] Platelets   [ ] FFP   [ ] Cryoprecipitate      INFECTIOUS DISEASES  WBC Count: 15.04 K/uL (12-25 @ 11:32)    RECENT CULTURES:    Meds: cefTRIAXone   IVPB      piperacillin/tazobactam IVPB. 3.375 Gram(s) IV Intermittent every 8 hours        ENDOCRINE  CAPILLARY BLOOD GLUCOSE        Meds:       ACCESS DEVICES:  [ ] Peripheral IV  [ ] Central Venous Line	[ ] R	[ ] L	[ ] IJ	[ ] Fem	[ ] SC	Placed:   [ ] Arterial Line		[ ] R	[ ] L	[ ] Fem	[ ] Rad	[ ] Ax	Placed:   [ ] PICC:					[ ] Mediport  [ ] Urinary Catheter, Date Placed:   [x] Necessity of urinary, arterial, and venous catheters discussed    OTHER MEDICATIONS:  latanoprost 0.005% Ophthalmic Solution 1 Drop(s) Both EYES at bedtime      CODE STATUS:  DNR/DNI

## 2018-12-25 NOTE — CONSULT NOTE ADULT - ASSESSMENT
89F Afib on xarelto s/p PPM, Dementia presents with unwitnessed fall - suspect syncope, Rt femur fx - s/p ORIF on 12/18 complicated by post-op leukocytosis, anemia, Afib with RVR, acute encephalopathy, admitted to SICU with Acute Hypoxic Respiratory Failure with Afib with RVR.   Neuro  -history of seizures, was on keppra, now withheld given it was likely an episode of syncope resulting from afib? Holding Remeron given a decline in mental status  -tylenol prn for pain control   -consider post-op delerium vs metabolic encephalopthy in a setting of sepsis 2/2 asp pna  Resp  -saturating >90% on Non rebreather, wean as tolerated  -CXR displayed trace bilateral pleural effusions  -revisit plans with daughter for possible DNI status  -Pending CT angio chest for possible Asp PNA, r/o PE  Cardiac  -Afib w/ RVR, now in sinus rhythm on cardizem gtt at 10, pt refused to take metoprolol and cardizem PO this AM  -vitals stable   GI  -NPO for now given possibility of aspiration  -will need to reassess diet status with swallow study   -bowel regimen of senna and colace  Renal  -Bladder scan at bedside did not show signs of retention  maintenance fluids at 50ml/hr  ID  -Consider empiric coverage for HCAP c/b aspiration, azithromycin and zosyn  Heme  -c/w xarelto fro Afib 89F Afib on xarelto s/p PPM, Dementia presents with unwitnessed fall - suspect syncope, Rt femur fx - s/p ORIF on 12/18 complicated by post-op leukocytosis, anemia, Afib with RVR, acute encephalopathy, admitted to SICU with Acute Hypoxic Respiratory Failure with Afib with RVR.   Neuro  -history of seizures, was on keppra, now withheld given it was likely an episode of syncope resulting from afib? Holding Remeron given a decline in mental status  -tylenol prn for pain control   -consider post-op delerium vs metabolic encephalopthy in a setting of sepsis 2/2 asp pna  Resp  -saturating >90% on Non rebreather, wean as tolerated  -CXR displayed trace bilateral pleural effusions  -revisit plans with daughter for possible DNI status  -Pending CT angio chest for possible Asp PNA, r/o PE  Cardiac  -Afib w/ RVR, now in sinus rhythm on cardizem gtt at 10, pt refused to take metoprolol and cardizem PO this AM  -vitals stable   GI  -NPO for now given possibility of aspiration  -will need to reassess diet status with swallow study   -bowel regimen of senna and colace  Renal  -Bladder scan at bedside did not show signs of retention  maintenance fluids at 50ml/hr  ID  -Consider empiric coverage for HCAP c/b aspiration, azithromycin and zosyn  -f/u urine culture, blood cx  Heme  -c/w xarelto fro Afib 89F Afib on xarelto s/p PPM, Dementia presents with unwitnessed fall - suspect syncope, Rt femur fx - s/p ORIF on 12/18 complicated by post-op leukocytosis, anemia, Afib with RVR, acute encephalopathy, admitted to SICU with Acute Hypoxic Respiratory Failure with Afib with RVR.   Neuro  -history of seizures, was on keppra, now withheld given it was likely an episode of syncope resulting from afib? Holding Remeron given a decline in mental status  -tylenol prn for pain control   -consider post-op delerium vs metabolic encephalopthy in a setting of sepsis 2/2 asp pna  Resp  -saturating >90% on Non rebreather, wean as tolerated  -CXR displayed trace bilateral pleural effusions  -revisit plans with daughter for possible DNI status  -Pending CT angio chest for possible Asp PNA, r/o PE  Cardiac  -Afib w/ RVR, now in sinus rhythm on cardizem gtt at 10, pt refused to take metoprolol and cardizem PO this AM  -vitals stable   GI  -NPO for now given possibility of aspiration  -will need to reassess diet status with swallow study   -bowel regimen of senna and colace  Renal  -Bladder scan at bedside did not show signs of retention  maintenance fluids at 50ml/hr  ID  -Consider empiric coverage for HCAP c/b aspiration, ceftriaxone and zosyn  -f/u urine culture, blood cx  Heme  -c/w xarelto fro Afib

## 2018-12-26 LAB
APTT BLD: 27.3 SEC — LOW (ref 27.5–36.3)
BASE EXCESS BLDA CALC-SCNC: 7.4 MMOL/L — SIGNIFICANT CHANGE UP
BASOPHILS # BLD AUTO: 0.03 K/UL — SIGNIFICANT CHANGE UP (ref 0–0.2)
BASOPHILS NFR BLD AUTO: 0.2 % — SIGNIFICANT CHANGE UP (ref 0–2)
BUN SERPL-MCNC: 28 MG/DL — HIGH (ref 7–23)
BUN SERPL-MCNC: 32 MG/DL — HIGH (ref 7–23)
CALCIUM SERPL-MCNC: 7.6 MG/DL — LOW (ref 8.4–10.5)
CALCIUM SERPL-MCNC: 8.7 MG/DL — SIGNIFICANT CHANGE UP (ref 8.4–10.5)
CHLORIDE BLDA-SCNC: 109 MMOL/L — HIGH (ref 96–108)
CHLORIDE SERPL-SCNC: 96 MMOL/L — LOW (ref 98–107)
CHLORIDE SERPL-SCNC: 99 MMOL/L — SIGNIFICANT CHANGE UP (ref 98–107)
CO2 SERPL-SCNC: 25 MMOL/L — SIGNIFICANT CHANGE UP (ref 22–31)
CO2 SERPL-SCNC: 29 MMOL/L — SIGNIFICANT CHANGE UP (ref 22–31)
CREAT SERPL-MCNC: 0.62 MG/DL — SIGNIFICANT CHANGE UP (ref 0.5–1.3)
CREAT SERPL-MCNC: 0.72 MG/DL — SIGNIFICANT CHANGE UP (ref 0.5–1.3)
EOSINOPHIL # BLD AUTO: 0.01 K/UL — SIGNIFICANT CHANGE UP (ref 0–0.5)
EOSINOPHIL NFR BLD AUTO: 0.1 % — SIGNIFICANT CHANGE UP (ref 0–6)
GLUCOSE BLDA-MCNC: 150 MG/DL — HIGH (ref 70–99)
GLUCOSE SERPL-MCNC: 208 MG/DL — HIGH (ref 70–99)
GLUCOSE SERPL-MCNC: 670 MG/DL — CRITICAL HIGH (ref 70–99)
HCO3 BLDA-SCNC: 31 MMOL/L — HIGH (ref 22–26)
HCT VFR BLD CALC: 25.5 % — LOW (ref 34.5–45)
HCT VFR BLD CALC: 27.2 % — LOW (ref 34.5–45)
HCT VFR BLD CALC: 27.9 % — LOW (ref 34.5–45)
HCT VFR BLDA CALC: 17.3 % — CRITICAL LOW (ref 34.5–46.5)
HGB BLD-MCNC: 7.7 G/DL — LOW (ref 11.5–15.5)
HGB BLD-MCNC: 8.2 G/DL — LOW (ref 11.5–15.5)
HGB BLD-MCNC: 8.5 G/DL — LOW (ref 11.5–15.5)
HGB BLDA-MCNC: 5.5 G/DL — CRITICAL LOW (ref 11.5–15.5)
IMM GRANULOCYTES # BLD AUTO: 0.06 # — SIGNIFICANT CHANGE UP
IMM GRANULOCYTES NFR BLD AUTO: 0.4 % — SIGNIFICANT CHANGE UP (ref 0–1.5)
INR BLD: 1.39 — HIGH (ref 0.88–1.17)
LACTATE BLDA-SCNC: 1.3 MMOL/L — SIGNIFICANT CHANGE UP (ref 0.5–2)
LYMPHOCYTES # BLD AUTO: 1.11 K/UL — SIGNIFICANT CHANGE UP (ref 1–3.3)
LYMPHOCYTES # BLD AUTO: 8 % — LOW (ref 13–44)
MAGNESIUM SERPL-MCNC: 1.6 MG/DL — SIGNIFICANT CHANGE UP (ref 1.6–2.6)
MAGNESIUM SERPL-MCNC: 1.9 MG/DL — SIGNIFICANT CHANGE UP (ref 1.6–2.6)
MCHC RBC-ENTMCNC: 28.9 PG — SIGNIFICANT CHANGE UP (ref 27–34)
MCHC RBC-ENTMCNC: 29.4 PG — SIGNIFICANT CHANGE UP (ref 27–34)
MCHC RBC-ENTMCNC: 30.1 % — LOW (ref 32–36)
MCHC RBC-ENTMCNC: 30.1 PG — SIGNIFICANT CHANGE UP (ref 27–34)
MCHC RBC-ENTMCNC: 30.2 % — LOW (ref 32–36)
MCHC RBC-ENTMCNC: 30.5 % — LOW (ref 32–36)
MCV RBC AUTO: 94.9 FL — SIGNIFICANT CHANGE UP (ref 80–100)
MCV RBC AUTO: 96.4 FL — SIGNIFICANT CHANGE UP (ref 80–100)
MCV RBC AUTO: 97.5 FL — SIGNIFICANT CHANGE UP (ref 80–100)
MONOCYTES # BLD AUTO: 0.54 K/UL — SIGNIFICANT CHANGE UP (ref 0–0.9)
MONOCYTES NFR BLD AUTO: 3.9 % — SIGNIFICANT CHANGE UP (ref 2–14)
NEUTROPHILS # BLD AUTO: 12.21 K/UL — HIGH (ref 1.8–7.4)
NEUTROPHILS NFR BLD AUTO: 87.4 % — HIGH (ref 43–77)
NRBC # FLD: 0.02 — SIGNIFICANT CHANGE UP
NRBC # FLD: 0.03 — SIGNIFICANT CHANGE UP
NRBC # FLD: 0.03 — SIGNIFICANT CHANGE UP
PCO2 BLDA: 43 MMHG — SIGNIFICANT CHANGE UP (ref 32–48)
PH BLDA: 7.48 PH — HIGH (ref 7.35–7.45)
PHOSPHATE SERPL-MCNC: 3.4 MG/DL — SIGNIFICANT CHANGE UP (ref 2.5–4.5)
PLATELET # BLD AUTO: 286 K/UL — SIGNIFICANT CHANGE UP (ref 150–400)
PLATELET # BLD AUTO: 299 K/UL — SIGNIFICANT CHANGE UP (ref 150–400)
PLATELET # BLD AUTO: 316 K/UL — SIGNIFICANT CHANGE UP (ref 150–400)
PMV BLD: 11 FL — SIGNIFICANT CHANGE UP (ref 7–13)
PMV BLD: 11.4 FL — SIGNIFICANT CHANGE UP (ref 7–13)
PMV BLD: 11.5 FL — SIGNIFICANT CHANGE UP (ref 7–13)
PO2 BLDA: 167 MMHG — HIGH (ref 83–108)
POTASSIUM BLDA-SCNC: 3.1 MMOL/L — LOW (ref 3.4–4.5)
POTASSIUM SERPL-MCNC: 3.1 MMOL/L — LOW (ref 3.5–5.3)
POTASSIUM SERPL-MCNC: 3.6 MMOL/L — SIGNIFICANT CHANGE UP (ref 3.5–5.3)
POTASSIUM SERPL-SCNC: 3.1 MMOL/L — LOW (ref 3.5–5.3)
POTASSIUM SERPL-SCNC: 3.6 MMOL/L — SIGNIFICANT CHANGE UP (ref 3.5–5.3)
PROTHROM AB SERPL-ACNC: 15.6 SEC — HIGH (ref 9.8–13.1)
RBC # BLD: 2.56 M/UL — LOW (ref 3.8–5.2)
RBC # BLD: 2.79 M/UL — LOW (ref 3.8–5.2)
RBC # BLD: 2.94 M/UL — LOW (ref 3.8–5.2)
RBC # FLD: 14.6 % — HIGH (ref 10.3–14.5)
RBC # FLD: 14.9 % — HIGH (ref 10.3–14.5)
RBC # FLD: 15.2 % — HIGH (ref 10.3–14.5)
SAO2 % BLDA: 99.8 % — HIGH (ref 95–99)
SODIUM BLDA-SCNC: 136 MMOL/L — SIGNIFICANT CHANGE UP (ref 136–146)
SODIUM SERPL-SCNC: 132 MMOL/L — LOW (ref 135–145)
SODIUM SERPL-SCNC: 140 MMOL/L — SIGNIFICANT CHANGE UP (ref 135–145)
SPECIMEN SOURCE: SIGNIFICANT CHANGE UP
SPECIMEN SOURCE: SIGNIFICANT CHANGE UP
TROPONIN T, HIGH SENSITIVITY: 24 NG/L — SIGNIFICANT CHANGE UP (ref ?–14)
WBC # BLD: 10.49 K/UL — SIGNIFICANT CHANGE UP (ref 3.8–10.5)
WBC # BLD: 13.96 K/UL — HIGH (ref 3.8–10.5)
WBC # BLD: 16.74 K/UL — HIGH (ref 3.8–10.5)
WBC # FLD AUTO: 10.49 K/UL — SIGNIFICANT CHANGE UP (ref 3.8–10.5)
WBC # FLD AUTO: 13.96 K/UL — HIGH (ref 3.8–10.5)
WBC # FLD AUTO: 16.74 K/UL — HIGH (ref 3.8–10.5)

## 2018-12-26 PROCEDURE — 99291 CRITICAL CARE FIRST HOUR: CPT

## 2018-12-26 PROCEDURE — 71045 X-RAY EXAM CHEST 1 VIEW: CPT | Mod: 26

## 2018-12-26 RX ORDER — METOPROLOL TARTRATE 50 MG
2.5 TABLET ORAL ONCE
Qty: 0 | Refills: 0 | Status: COMPLETED | OUTPATIENT
Start: 2018-12-26 | End: 2018-12-26

## 2018-12-26 RX ORDER — POTASSIUM CHLORIDE 20 MEQ
10 PACKET (EA) ORAL
Qty: 0 | Refills: 0 | Status: COMPLETED | OUTPATIENT
Start: 2018-12-26 | End: 2018-12-26

## 2018-12-26 RX ORDER — AMIODARONE HYDROCHLORIDE 400 MG/1
150 TABLET ORAL ONCE
Qty: 0 | Refills: 0 | Status: DISCONTINUED | OUTPATIENT
Start: 2018-12-26 | End: 2018-12-26

## 2018-12-26 RX ORDER — VANCOMYCIN HCL 1 G
1000 VIAL (EA) INTRAVENOUS DAILY
Qty: 0 | Refills: 0 | Status: DISCONTINUED | OUTPATIENT
Start: 2018-12-26 | End: 2018-12-27

## 2018-12-26 RX ORDER — SODIUM CHLORIDE 9 MG/ML
500 INJECTION, SOLUTION INTRAVENOUS ONCE
Qty: 0 | Refills: 0 | Status: COMPLETED | OUTPATIENT
Start: 2018-12-26 | End: 2018-12-26

## 2018-12-26 RX ORDER — MAGNESIUM SULFATE 500 MG/ML
2 VIAL (ML) INJECTION ONCE
Qty: 0 | Refills: 0 | Status: COMPLETED | OUTPATIENT
Start: 2018-12-26 | End: 2018-12-26

## 2018-12-26 RX ORDER — AMIODARONE HYDROCHLORIDE 400 MG/1
1 TABLET ORAL
Qty: 900 | Refills: 0 | Status: DISCONTINUED | OUTPATIENT
Start: 2018-12-26 | End: 2018-12-26

## 2018-12-26 RX ORDER — DEXTROSE MONOHYDRATE, SODIUM CHLORIDE, AND POTASSIUM CHLORIDE 50; .745; 4.5 G/1000ML; G/1000ML; G/1000ML
1000 INJECTION, SOLUTION INTRAVENOUS
Qty: 0 | Refills: 0 | Status: DISCONTINUED | OUTPATIENT
Start: 2018-12-26 | End: 2018-12-27

## 2018-12-26 RX ORDER — DILTIAZEM HCL 120 MG
15 CAPSULE, EXT RELEASE 24 HR ORAL
Qty: 125 | Refills: 0 | Status: DISCONTINUED | OUTPATIENT
Start: 2018-12-26 | End: 2018-12-26

## 2018-12-26 RX ORDER — ALBUMIN HUMAN 25 %
250 VIAL (ML) INTRAVENOUS ONCE
Qty: 0 | Refills: 0 | Status: COMPLETED | OUTPATIENT
Start: 2018-12-26 | End: 2018-12-26

## 2018-12-26 RX ORDER — METOPROLOL TARTRATE 50 MG
5 TABLET ORAL EVERY 6 HOURS
Qty: 0 | Refills: 0 | Status: DISCONTINUED | OUTPATIENT
Start: 2018-12-26 | End: 2018-12-27

## 2018-12-26 RX ORDER — DILTIAZEM HCL 120 MG
15 CAPSULE, EXT RELEASE 24 HR ORAL
Qty: 125 | Refills: 0 | Status: DISCONTINUED | OUTPATIENT
Start: 2018-12-26 | End: 2018-12-27

## 2018-12-26 RX ADMIN — DEXTROSE MONOHYDRATE, SODIUM CHLORIDE, AND POTASSIUM CHLORIDE 50 MILLILITER(S): 50; .745; 4.5 INJECTION, SOLUTION INTRAVENOUS at 08:01

## 2018-12-26 RX ADMIN — Medication 650 MILLIGRAM(S): at 00:18

## 2018-12-26 RX ADMIN — Medication 2.5 MILLIGRAM(S): at 03:10

## 2018-12-26 RX ADMIN — LATANOPROST 1 DROP(S): 0.05 SOLUTION/ DROPS OPHTHALMIC; TOPICAL at 23:07

## 2018-12-26 RX ADMIN — Medication 100 MILLIEQUIVALENT(S): at 14:46

## 2018-12-26 RX ADMIN — PIPERACILLIN AND TAZOBACTAM 25 GRAM(S): 4; .5 INJECTION, POWDER, LYOPHILIZED, FOR SOLUTION INTRAVENOUS at 16:09

## 2018-12-26 RX ADMIN — SODIUM CHLORIDE 500 MILLILITER(S): 9 INJECTION, SOLUTION INTRAVENOUS at 03:25

## 2018-12-26 RX ADMIN — Medication 5 MILLIGRAM(S): at 23:08

## 2018-12-26 RX ADMIN — Medication 100 MILLIEQUIVALENT(S): at 11:29

## 2018-12-26 RX ADMIN — Medication 50 GRAM(S): at 08:00

## 2018-12-26 RX ADMIN — Medication 250 MILLIGRAM(S): at 14:30

## 2018-12-26 RX ADMIN — Medication 15 MG/HR: at 08:02

## 2018-12-26 RX ADMIN — Medication 125 MILLILITER(S): at 00:17

## 2018-12-26 RX ADMIN — PIPERACILLIN AND TAZOBACTAM 25 GRAM(S): 4; .5 INJECTION, POWDER, LYOPHILIZED, FOR SOLUTION INTRAVENOUS at 08:00

## 2018-12-26 RX ADMIN — PIPERACILLIN AND TAZOBACTAM 25 GRAM(S): 4; .5 INJECTION, POWDER, LYOPHILIZED, FOR SOLUTION INTRAVENOUS at 23:25

## 2018-12-26 RX ADMIN — SODIUM CHLORIDE 1000 MILLILITER(S): 9 INJECTION, SOLUTION INTRAVENOUS at 04:53

## 2018-12-26 RX ADMIN — Medication 650 MILLIGRAM(S): at 00:38

## 2018-12-26 RX ADMIN — Medication 100 MILLIEQUIVALENT(S): at 08:01

## 2018-12-26 RX ADMIN — Medication 15 MG/HR: at 05:30

## 2018-12-26 RX ADMIN — PIPERACILLIN AND TAZOBACTAM 25 GRAM(S): 4; .5 INJECTION, POWDER, LYOPHILIZED, FOR SOLUTION INTRAVENOUS at 00:18

## 2018-12-26 NOTE — PROGRESS NOTE ADULT - SUBJECTIVE AND OBJECTIVE BOX
Pt becoming more hypotensive and somnolent D/W daughter who is HCP, pt is DNR, DNI they don't want pressors will support with IVF, colloid and switch Cardizem to amiodarone if needed. H/H is relatively stable for now.

## 2018-12-26 NOTE — PROGRESS NOTE ADULT - ASSESSMENT
89F Afib on xarelto s/p PPM, Dementia presents with unwitnessed fall - suspect syncope, Rt femur fx - s/p ORIF on 12/18 complicated by post-op leukocytosis, anemia, Afib with RVR, acute encephalopathy, admitted to SICU with Acute Hypoxic Respiratory Failure with Afib with RVR.     Neuro  -history of seizures, was on keppra, now withheld given it was likely an episode of syncope resulting from afib? Holding Remeron given a decline in mental status  -tylenol prn for pain control   -consider post-op delerium vs metabolic encephalopthy in a setting of sepsis 2/2 asp pna    Resp  -saturating >90% on Non rebreather, wean as tolerated  -CXR displayed trace bilateral pleural effusions  -revisit plans with daughter for possible DNI status  -Pending CT angio chest for possible Asp PNA, r/o PE    Cardiac  -Afib w/ RVR,   -Cont cardizem gtt with HR goal 110  -vitals stable     GI  -NPO for now given history of aspiration   -bowel regimen of senna and colace    Renal  -Bladder scan at bedside did not show signs of retention  maintenance fluids at 50ml/hr    ID  -Cont ceftriaxone and zosyn for HCAP  -f/u urine culture, blood cx    Heme  -c/w xarelto fro Afib 89F Afib on xarelto s/p PPM, dementia presents with unwitnessed fall - suspect syncope, Rt femur fx - s/p ORIF on 12/18 complicated by post-op leukocytosis, anemia, Afib with RVR, acute encephalopathy. Admitted to SICU with Acute Hypoxic Respiratory Failure with Afib with RVR.     Neuro  -history of seizures, was on keppra, now withheld given it was likely an episode of syncope resulting from afib? Holding Remeron given a decline in mental status  -tylenol prn for pain control   -consider post-op delerium vs. metabolic encephalopthy in a setting of sepsis 2/2 asp pna    Resp  -saturating >90% on non rebreather, wean as tolerated  -CXR displayed trace bilateral pleural effusions  -revisit plans with daughter for possible DNI status  -Pending CT angio chest for possible Asp PNA, r/o PE    Cardiac  -afib w/ RVR,   -cont cardizem gtt with HR goal 110  -vitals stable     GI  -NPO for now given history of aspiration   -bowel regimen of senna and colace    Renal  -bladder scan at bedside did not show signs of retention  -maintenance fluids at 50ml/hr    Heme  -cont xarelto for Afib    MSK:   -PT   -OOB to chair     ID  -continue zosyn for HCAP, d/c ceftriaxone  -start vanco qd  -f/u urine culture, blood cx    Dispo: SICU

## 2018-12-26 NOTE — PROGRESS NOTE ADULT - SUBJECTIVE AND OBJECTIVE BOX
Orthopedic Surgery Progress Note     S: Patient seen and examined today. Transferred to SICU for hypoxemic respiratory failure and rapid afib. On cardizem gtt and nonrebreather in SICU    MEDICATIONS  (STANDING):  cefTRIAXone   IVPB 1 Gram(s) IV Intermittent every 24 hours  cefTRIAXone   IVPB      dextrose 5% + sodium chloride 0.45%. 1000 milliLiter(s) (50 mL/Hr) IV Continuous <Continuous>  diltiazem Infusion 15 mG/Hr (15 mL/Hr) IV Continuous <Continuous>  docusate sodium 300 milliGRAM(s) Oral daily  latanoprost 0.005% Ophthalmic Solution 1 Drop(s) Both EYES at bedtime  piperacillin/tazobactam IVPB. 3.375 Gram(s) IV Intermittent every 8 hours  rivaroxaban 10 milliGRAM(s) Oral every 24 hours  senna 2 Tablet(s) Oral at bedtime    MEDICATIONS  (PRN):  acetaminophen  Suppository .. 650 milliGRAM(s) Rectal every 6 hours PRN Temp greater or equal to 38C (100.4F), Mild Pain (1 - 3)  haloperidol    Injectable 2 milliGRAM(s) IV Push every 6 hours PRN agitation  metoprolol tartrate Injectable 5 milliGRAM(s) IV Push every 6 hours PRN HR>110      Physical Exam:    Vital Signs Last 24 Hrs  T(C): 37.6 (26 Dec 2018 04:00), Max: 38.4 (25 Dec 2018 14:52)  T(F): 99.7 (26 Dec 2018 04:00), Max: 101.2 (25 Dec 2018 14:52)  HR: 105 (26 Dec 2018 06:00) (101 - 178)  BP: 100/43 (26 Dec 2018 06:00) (83/41 - 148/111)  BP(mean): 57 (26 Dec 2018 06:00) (49 - 121)  RR: 19 (26 Dec 2018 06:00) (17 - 38)  SpO2: 99% (26 Dec 2018 06:00) (78% - 100%)    12-25-18 @ 07:01  -  12-26-18 @ 06:20  --------------------------------------------------------  IN: 2490 mL / OUT: 400 mL / NET: 2090 mL        Gen: In SICU. alert  Resp: nonrebreather  Ext: Ext WWP  Incision: c/d/i, no erythema or edema          LABS:                        8.5    16.74 )-----------( 316      ( 26 Dec 2018 04:00 )             27.9     12-26    140  |  99  |  32<H>  ----------------------------<  208<H>  3.6   |  29  |  0.72    Ca    8.7      26 Dec 2018 04:00  Phos  3.4     12-26  Mg     1.9     12-26

## 2018-12-26 NOTE — PROGRESS NOTE ADULT - ASSESSMENT
afib with RVR  acute hypoxic respiratory failure     likely due to aspiration PNA  no evidence of PE  consider viral panel   o2  cont cardizem for rate control   BP on soft side, we can use digoxin and decrease cardizem rate   as per SICU however , family have decided to pursue comfort care

## 2018-12-26 NOTE — PROGRESS NOTE ADULT - ASSESSMENT
A/P 89yo F s/p right hip IMN POD #8.    Continue SICU care. stable from orthopedic standpoint  Pain Control  DVT PPX  PT/OOB  WBAT   c/w cardizem gtt      Ortho [id number]

## 2018-12-26 NOTE — PROGRESS NOTE ADULT - SUBJECTIVE AND OBJECTIVE BOX
HISTORY  89F Afib on xarelto s/p PPM, Dementia presents with unwitnessed fall - suspect syncope, Rt femur fx - s/p ORIF on 12/18 complicated by post-op leukocytosis, anemia, Afib with RVR, acute encephalopathy. Pt seen on the floor in Afib, max HR in 170s, somehwat altered and on non rebreather. Cardizem 20mg was given and with cardizem gtt, she entered a sinus rhythm. Two hours later pt became more hypoxic, saturating 91% on non rebreather. She has a rising white count, afebrile, no productive cough or chest pain. Mental status A/O-1-2. Bedside ultrasound did not display evidence of pulmonary edema, although she did receive 20mg Lasix prior to scan. Pt to be transported to CT scan for CT angio chest, and come directly to SICU afterwards for acute hypoxic respiratory failure 2/2 possible aspiration PNA, less likely PE since she has been on xarelto. Family has been spoken to about DNR/DNI, team to revist goals of care plans with daughter today.       24 HOUR EVENTS: No acute events overnight. Patient is maxed on cardizem gtt and on 5 lopressor q6 afib to the 150s, down to 110s on face mask O2. Patient A&Ox2 good urine output. Patient pressures were soft to 80s/50s, given 250 albumin bolus with good response.    SUBJECTIVE/ROS:  [x] A ten-point review of systems was otherwise negative except as noted.  [x] Due to altered mental status/intubation, subjective information were not able to be obtained from the patient. History was obtained, to the extent possible, from review of the chart and collateral sources of information.      NEURO  GCS:  15     Exam: awake, alert  Meds: acetaminophen  Suppository .. 650 milliGRAM(s) Rectal every 6 hours PRN Temp greater or equal to 38C (100.4F), Mild Pain (1 - 3)  haloperidol    Injectable 2 milliGRAM(s) IV Push every 6 hours PRN agitation    [x] Adequacy of sedation and pain control has been assessed and adjusted      RESPIRATORY  RR: 24 (12-25-18 @ 23:00) (17 - 38)  SpO2: 100% (12-25-18 @ 23:00) (78% - 100%)  Exam: unlabored, clear to auscultation bilaterally  Mechanical Ventilation:     [N/A] Extubation Readiness Assessed  Meds:       CARDIOVASCULAR  HR: 124 (12-25-18 @ 23:00) (102 - 178)  BP: 103/68 (12-25-18 @ 23:00) (95/54 - 148/111)  BP(mean): 74 (12-25-18 @ 23:00) (60 - 121)        Exam: regular rate and rhythm  Cardiac Rhythm: sinus  Perfusion     [x]Adequate   [ ]Inadequate  Mentation   [x]Normal       [ ]Reduced  Extremities  [x]Warm         [ ]Cool  Volume Status [ ]Hypervolemic [x]Euvolemic [ ]Hypovolemic  Meds: diltiazem Infusion 15 mG/Hr IV Continuous <Continuous>  metoprolol tartrate Injectable 5 milliGRAM(s) IV Push every 6 hours PRN HR>110        GI/NUTRITION  Exam: soft, nontender, nondistended  Diet: NPO  Meds: docusate sodium 300 milliGRAM(s) Oral daily  senna 2 Tablet(s) Oral at bedtime      GENITOURINARY  I&O's Detail    12-25 @ 07:01  -  12-26 @ 00:00  --------------------------------------------------------  IN:    dextrose 5% + sodium chloride 0.45%.: 400 mL    diltiazem Infusion: 60 mL    diltiazem Infusion: 75 mL    IV PiggyBack: 150 mL  Total IN: 685 mL    OUT:    Intermittent Catheterization - Urethral: 400 mL  Total OUT: 400 mL    Total NET: 285 mL        Weight (kg): 67.4 (12-25 @ 14:52)  12-25    144  |  102  |  31<H>  ----------------------------<  121<H>  3.7   |  27  |  0.59    Ca    9.4      25 Dec 2018 11:32  Phos  4.0     12-25  Mg     1.9     12-25      [ ] Martinez catheter, indication: N/A  Meds: dextrose 5% + sodium chloride 0.45%. 1000 milliLiter(s) IV Continuous <Continuous>        HEMATOLOGIC  Meds: rivaroxaban 10 milliGRAM(s) Oral every 24 hours    [x] VTE Prophylaxis                        10.4   15.04 )-----------( 364      ( 25 Dec 2018 11:32 )             33.3       Transfusion     [ ] PRBC   [ ] Platelets   [ ] FFP   [ ] Cryoprecipitate      INFECTIOUS DISEASES  WBC Count: 15.04 K/uL (12-25 @ 11:32)    RECENT CULTURES:    Meds: cefTRIAXone   IVPB      piperacillin/tazobactam IVPB. 3.375 Gram(s) IV Intermittent every 8 hours        ENDOCRINE  CAPILLARY BLOOD GLUCOSE        Meds:       ACCESS DEVICES:  [ ] Peripheral IV  [ ] Central Venous Line	[ ] R	[ ] L	[ ] IJ	[ ] Fem	[ ] SC	Placed:   [ ] Arterial Line		[ ] R	[ ] L	[ ] Fem	[ ] Rad	[ ] Ax	Placed:   [ ] PICC:					[ ] Mediport  [ ] Urinary Catheter, Date Placed:   [x] Necessity of urinary, arterial, and venous catheters discussed    OTHER MEDICATIONS:  latanoprost 0.005% Ophthalmic Solution 1 Drop(s) Both EYES at bedtime      CODE STATUS:  DNR/DNI HISTORY  89F Afib on xarelto s/p PPM, Dementia presents with unwitnessed fall - suspect syncope, Rt femur fx - s/p ORIF on 12/18 complicated by post-op leukocytosis, anemia, Afib with RVR, acute encephalopathy. Pt seen on the floor in Afib, max HR in 170s, somehwat altered and on non rebreather. Cardizem 20mg was given and with cardizem gtt, she entered a sinus rhythm. Two hours later pt became more hypoxic, saturating 91% on non rebreather. She has a rising white count, afebrile, no productive cough or chest pain. Mental status A/O-1-2. Bedside ultrasound did not display evidence of pulmonary edema, although she did receive 20mg Lasix prior to scan. Pt to be transported to CT scan for CT angio chest, and come directly to SICU afterwards for acute hypoxic respiratory failure 2/2 possible aspiration PNA, less likely PE since she has been on xarelto. Family has been spoken to about DNR/DNI, team to revist goals of care plans with daughter today.       24 HOUR EVENTS: No acute events overnight. Patient is maxed on cardizem gtt and on 5 lopressor q6 afib to the 150s, down to 110s on face mask O2. Patient A&Ox2 good urine output. Patient pressures were soft to 80s/50s, given 250 albumin bolus with good response.    SUBJECTIVE/ROS:  [x] A ten-point review of systems was otherwise negative except as noted.  [x] Due to altered mental status/intubation, subjective information were not able to be obtained from the patient. History was obtained, to the extent possible, from review of the chart and collateral sources of information.      NEURO  GCS:  15     Exam: awake, alert  Meds: acetaminophen  Suppository .. 650 milliGRAM(s) Rectal every 6 hours PRN Temp greater or equal to 38C (100.4F), Mild Pain (1 - 3)  haloperidol    Injectable 2 milliGRAM(s) IV Push every 6 hours PRN agitation    [x] Adequacy of sedation and pain control has been assessed and adjusted      RESPIRATORY  RR: 24 (12-25-18 @ 23:00) (17 - 38)  SpO2: 100% (12-25-18 @ 23:00) (78% - 100%)  Exam: unlabored, clear to auscultation bilaterally  Mechanical Ventilation:     [N/A] Extubation Readiness Assessed  Meds:       CARDIOVASCULAR  HR: 124 (12-25-18 @ 23:00) (102 - 178)  BP: 103/68 (12-25-18 @ 23:00) (95/54 - 148/111)  BP(mean): 74 (12-25-18 @ 23:00) (60 - 121)    Exam: regular rate and rhythm  Cardiac Rhythm: sinus  Perfusion     [x]Adequate   [ ]Inadequate  Mentation   [x]Normal       [ ]Reduced  Extremities  [x]Warm         [ ]Cool  Volume Status [ ]Hypervolemic [x]Euvolemic [ ]Hypovolemic  Meds: diltiazem Infusion 15 mG/Hr IV Continuous <Continuous>  metoprolol tartrate Injectable 5 milliGRAM(s) IV Push every 6 hours PRN HR>110      GI/NUTRITION  Exam: soft, nontender, nondistended  Diet: NPO  Meds: docusate sodium 300 milliGRAM(s) Oral daily  senna 2 Tablet(s) Oral at bedtime      GENITOURINARY  I&O's Detail    12-25 @ 07:01  -  12-26 @ 00:00  --------------------------------------------------------  IN:    dextrose 5% + sodium chloride 0.45%.: 400 mL    diltiazem Infusion: 60 mL    diltiazem Infusion: 75 mL    IV PiggyBack: 150 mL  Total IN: 685 mL    OUT:    Intermittent Catheterization - Urethral: 400 mL  Total OUT: 400 mL    Total NET: 285 mL        Weight (kg): 67.4 (12-25 @ 14:52)  12-25    144  |  102  |  31<H>  ----------------------------<  121<H>  3.7   |  27  |  0.59    Ca    9.4      25 Dec 2018 11:32  Phos  4.0     12-25  Mg     1.9     12-25      [x] Martinez catheter, indication: monitoring in critically ill  Meds: dextrose 5% + sodium chloride 0.45%. 1000 milliLiter(s) IV Continuous <Continuous>      HEMATOLOGIC  Meds: rivaroxaban 10 milliGRAM(s) Oral every 24 hours    [x] VTE Prophylaxis                        10.4   15.04 )-----------( 364      ( 25 Dec 2018 11:32 )             33.3       Transfusion     [ ] PRBC   [ ] Platelets   [ ] FFP   [ ] Cryoprecipitate      INFECTIOUS DISEASES  WBC Count: 15.04 K/uL (12-25 @ 11:32)    RECENT CULTURES:    Meds: cefTRIAXone   IVPB      piperacillin/tazobactam IVPB. 3.375 Gram(s) IV Intermittent every 8 hours        ENDOCRINE  CAPILLARY BLOOD GLUCOSE      Meds:     ACCESS DEVICES:  [x] Peripheral IV  [ ] Central Venous Line	[ ] R	[ ] L	[ ] IJ	[ ] Fem	[ ] SC	Placed:   [ ] Arterial Line		[ ] R	[ ] L	[ ] Fem	[ ] Rad	[ ] Ax	Placed:   [ ] PICC:					[ ] Mediport  [x] Urinary Catheter, Date Placed:   [x] Necessity of urinary, arterial, and venous catheters discussed    OTHER MEDICATIONS:  latanoprost 0.005% Ophthalmic Solution 1 Drop(s) Both EYES at bedtime      CODE STATUS: DNR/DNI

## 2018-12-26 NOTE — PROGRESS NOTE ADULT - SUBJECTIVE AND OBJECTIVE BOX
Subjective: Patient seen and examined. No new events except as noted.     SUBJECTIVE/ROS:  pt in SICU now        MEDICATIONS:  MEDICATIONS  (STANDING):  cefTRIAXone   IVPB 1 Gram(s) IV Intermittent every 24 hours  cefTRIAXone   IVPB      dextrose 5% + sodium chloride 0.45% with potassium chloride 20 mEq/L 1000 milliLiter(s) (50 mL/Hr) IV Continuous <Continuous>  diltiazem Infusion 15 mG/Hr (15 mL/Hr) IV Continuous <Continuous>  docusate sodium 300 milliGRAM(s) Oral daily  latanoprost 0.005% Ophthalmic Solution 1 Drop(s) Both EYES at bedtime  magnesium sulfate  IVPB 2 Gram(s) IV Intermittent once  piperacillin/tazobactam IVPB. 3.375 Gram(s) IV Intermittent every 8 hours  potassium chloride  10 mEq/100 mL IVPB 10 milliEquivalent(s) IV Intermittent every 1 hour  rivaroxaban 10 milliGRAM(s) Oral every 24 hours  senna 2 Tablet(s) Oral at bedtime      PHYSICAL EXAM:  T(C): 37.6 (12-26-18 @ 04:00), Max: 38.4 (12-25-18 @ 14:52)  HR: 105 (12-26-18 @ 06:00) (101 - 178)  BP: 100/43 (12-26-18 @ 06:00) (83/41 - 148/111)  RR: 19 (12-26-18 @ 06:00) (19 - 38)  SpO2: 99% (12-26-18 @ 06:00) (78% - 100%)  Wt(kg): --  I&O's Summary    25 Dec 2018 07:01  -  26 Dec 2018 07:00  --------------------------------------------------------  IN: 2490 mL / OUT: 400 mL / NET: 2090 mL      Height (cm): 170.18 (12-25 @ 14:52)  Weight (kg): 67.4 (12-25 @ 14:52)  BMI (kg/m2): 23.3 (12-25 @ 14:52)  BSA (m2): 1.78 (12-25 @ 14:52)    JVP: elevated   Neck: supple  Lung: rhonchus   CV: S1 S2 , Murmur:  Abd: soft  Ext: No edema  neuro: lethargic   Psych: flat affect  Skin: normal        LABS/DATA:    CARDIAC MARKERS:                                8.5    16.74 )-----------( 316      ( 26 Dec 2018 04:00 )             27.9     12-26    140  |  99  |  32<H>  ----------------------------<  208<H>  3.6   |  29  |  0.72    Ca    8.7      26 Dec 2018 04:00  Phos  3.4     12-26  Mg     1.9     12-26      proBNP: Serum Pro-Brain Natriuretic Peptide: 1677 pg/mL (12-25 @ 11:32)    Lipid Profile:   HgA1c:   TSH:     TELE:  EKG:

## 2018-12-27 LAB
BACTERIA UR CULT: SIGNIFICANT CHANGE UP
BUN SERPL-MCNC: 25 MG/DL — HIGH (ref 7–23)
CALCIUM SERPL-MCNC: 7.9 MG/DL — LOW (ref 8.4–10.5)
CHLORIDE SERPL-SCNC: 100 MMOL/L — SIGNIFICANT CHANGE UP (ref 98–107)
CO2 SERPL-SCNC: 27 MMOL/L — SIGNIFICANT CHANGE UP (ref 22–31)
CREAT SERPL-MCNC: 0.62 MG/DL — SIGNIFICANT CHANGE UP (ref 0.5–1.3)
GLUCOSE BLDC GLUCOMTR-MCNC: 131 MG/DL — HIGH (ref 70–99)
GLUCOSE SERPL-MCNC: 429 MG/DL — HIGH (ref 70–99)
HCT VFR BLD CALC: 22.4 % — LOW (ref 34.5–45)
HCT VFR BLD CALC: 25.4 % — LOW (ref 34.5–45)
HGB BLD-MCNC: 6.6 G/DL — CRITICAL LOW (ref 11.5–15.5)
HGB BLD-MCNC: 7.4 G/DL — LOW (ref 11.5–15.5)
MAGNESIUM SERPL-MCNC: 1.9 MG/DL — SIGNIFICANT CHANGE UP (ref 1.6–2.6)
MCHC RBC-ENTMCNC: 28.6 PG — SIGNIFICANT CHANGE UP (ref 27–34)
MCHC RBC-ENTMCNC: 29.1 % — LOW (ref 32–36)
MCHC RBC-ENTMCNC: 29.1 PG — SIGNIFICANT CHANGE UP (ref 27–34)
MCHC RBC-ENTMCNC: 29.5 % — LOW (ref 32–36)
MCV RBC AUTO: 98.1 FL — SIGNIFICANT CHANGE UP (ref 80–100)
MCV RBC AUTO: 98.7 FL — SIGNIFICANT CHANGE UP (ref 80–100)
NRBC # FLD: 0 — SIGNIFICANT CHANGE UP
NRBC # FLD: 0 — SIGNIFICANT CHANGE UP
PHOSPHATE SERPL-MCNC: 2.2 MG/DL — LOW (ref 2.5–4.5)
PLATELET # BLD AUTO: 304 K/UL — SIGNIFICANT CHANGE UP (ref 150–400)
PLATELET # BLD AUTO: 315 K/UL — SIGNIFICANT CHANGE UP (ref 150–400)
PMV BLD: 11.4 FL — SIGNIFICANT CHANGE UP (ref 7–13)
PMV BLD: 11.5 FL — SIGNIFICANT CHANGE UP (ref 7–13)
POTASSIUM SERPL-MCNC: 4.5 MMOL/L — SIGNIFICANT CHANGE UP (ref 3.5–5.3)
POTASSIUM SERPL-SCNC: 4.5 MMOL/L — SIGNIFICANT CHANGE UP (ref 3.5–5.3)
RBC # BLD: 2.27 M/UL — LOW (ref 3.8–5.2)
RBC # BLD: 2.59 M/UL — LOW (ref 3.8–5.2)
RBC # FLD: 14.7 % — HIGH (ref 10.3–14.5)
RBC # FLD: 14.7 % — HIGH (ref 10.3–14.5)
SODIUM SERPL-SCNC: 134 MMOL/L — LOW (ref 135–145)
SPECIMEN SOURCE: SIGNIFICANT CHANGE UP
VANCOMYCIN FLD-MCNC: < 1.3 UG/ML — SIGNIFICANT CHANGE UP
WBC # BLD: 8.9 K/UL — SIGNIFICANT CHANGE UP (ref 3.8–10.5)
WBC # BLD: 9.63 K/UL — SIGNIFICANT CHANGE UP (ref 3.8–10.5)
WBC # FLD AUTO: 8.9 K/UL — SIGNIFICANT CHANGE UP (ref 3.8–10.5)
WBC # FLD AUTO: 9.63 K/UL — SIGNIFICANT CHANGE UP (ref 3.8–10.5)

## 2018-12-27 PROCEDURE — 99223 1ST HOSP IP/OBS HIGH 75: CPT

## 2018-12-27 PROCEDURE — 76604 US EXAM CHEST: CPT | Mod: 26

## 2018-12-27 PROCEDURE — 99291 CRITICAL CARE FIRST HOUR: CPT

## 2018-12-27 RX ORDER — FUROSEMIDE 40 MG
20 TABLET ORAL ONCE
Qty: 0 | Refills: 0 | Status: COMPLETED | OUTPATIENT
Start: 2018-12-27 | End: 2018-12-27

## 2018-12-27 RX ORDER — ACETAMINOPHEN 500 MG
650 TABLET ORAL EVERY 6 HOURS
Qty: 0 | Refills: 0 | Status: DISCONTINUED | OUTPATIENT
Start: 2018-12-27 | End: 2018-12-28

## 2018-12-27 RX ORDER — FUROSEMIDE 40 MG
20 TABLET ORAL ONCE
Qty: 0 | Refills: 0 | Status: DISCONTINUED | OUTPATIENT
Start: 2018-12-27 | End: 2018-12-27

## 2018-12-27 RX ORDER — DIGOXIN 250 MCG
0.5 TABLET ORAL ONCE
Qty: 0 | Refills: 0 | Status: COMPLETED | OUTPATIENT
Start: 2018-12-27 | End: 2018-12-27

## 2018-12-27 RX ORDER — VANCOMYCIN HCL 1 G
1250 VIAL (EA) INTRAVENOUS DAILY
Qty: 0 | Refills: 0 | Status: DISCONTINUED | OUTPATIENT
Start: 2018-12-27 | End: 2018-12-27

## 2018-12-27 RX ORDER — HEPARIN SODIUM 5000 [USP'U]/ML
5000 INJECTION INTRAVENOUS; SUBCUTANEOUS EVERY 8 HOURS
Qty: 0 | Refills: 0 | Status: DISCONTINUED | OUTPATIENT
Start: 2018-12-27 | End: 2018-12-28

## 2018-12-27 RX ORDER — CHLORHEXIDINE GLUCONATE 213 G/1000ML
1 SOLUTION TOPICAL ONCE
Qty: 0 | Refills: 0 | Status: COMPLETED | OUTPATIENT
Start: 2018-12-27 | End: 2018-12-27

## 2018-12-27 RX ORDER — DIGOXIN 250 MCG
0.12 TABLET ORAL DAILY
Qty: 0 | Refills: 0 | Status: DISCONTINUED | OUTPATIENT
Start: 2018-12-28 | End: 2018-12-28

## 2018-12-27 RX ORDER — DILTIAZEM HCL 120 MG
15 CAPSULE, EXT RELEASE 24 HR ORAL
Qty: 125 | Refills: 0 | Status: DISCONTINUED | OUTPATIENT
Start: 2018-12-27 | End: 2018-12-27

## 2018-12-27 RX ORDER — DIGOXIN 250 MCG
0.25 TABLET ORAL EVERY 6 HOURS
Qty: 0 | Refills: 0 | Status: COMPLETED | OUTPATIENT
Start: 2018-12-27 | End: 2018-12-27

## 2018-12-27 RX ADMIN — Medication 15 MG/HR: at 08:10

## 2018-12-27 RX ADMIN — HALOPERIDOL DECANOATE 2 MILLIGRAM(S): 100 INJECTION INTRAMUSCULAR at 13:11

## 2018-12-27 RX ADMIN — Medication 0.25 MILLIGRAM(S): at 12:21

## 2018-12-27 RX ADMIN — PIPERACILLIN AND TAZOBACTAM 25 GRAM(S): 4; .5 INJECTION, POWDER, LYOPHILIZED, FOR SOLUTION INTRAVENOUS at 08:41

## 2018-12-27 RX ADMIN — Medication 63.75 MILLIMOLE(S): at 04:07

## 2018-12-27 RX ADMIN — Medication 650 MILLIGRAM(S): at 18:53

## 2018-12-27 RX ADMIN — DEXTROSE MONOHYDRATE, SODIUM CHLORIDE, AND POTASSIUM CHLORIDE 50 MILLILITER(S): 50; .745; 4.5 INJECTION, SOLUTION INTRAVENOUS at 08:10

## 2018-12-27 RX ADMIN — Medication 650 MILLIGRAM(S): at 12:21

## 2018-12-27 RX ADMIN — Medication 0.25 MILLIGRAM(S): at 21:13

## 2018-12-27 RX ADMIN — LATANOPROST 1 DROP(S): 0.05 SOLUTION/ DROPS OPHTHALMIC; TOPICAL at 21:13

## 2018-12-27 RX ADMIN — Medication 10 MILLIGRAM(S): at 12:05

## 2018-12-27 RX ADMIN — HEPARIN SODIUM 5000 UNIT(S): 5000 INJECTION INTRAVENOUS; SUBCUTANEOUS at 21:13

## 2018-12-27 RX ADMIN — Medication 0.5 MILLIGRAM(S): at 05:59

## 2018-12-27 RX ADMIN — Medication 20 MILLIGRAM(S): at 17:30

## 2018-12-27 RX ADMIN — RIVAROXABAN 10 MILLIGRAM(S): KIT at 05:57

## 2018-12-27 RX ADMIN — Medication 650 MILLIGRAM(S): at 18:23

## 2018-12-27 RX ADMIN — Medication 650 MILLIGRAM(S): at 13:20

## 2018-12-27 NOTE — PROGRESS NOTE ADULT - SUBJECTIVE AND OBJECTIVE BOX
SICU  Progress Note    24 HOUR EVENTS: No acute events overnight. Patient continued on cardizem drip. HR in low 100's with normal blood pressure.     HISTORY  89F Afib on xarelto s/p PPM, Dementia presents with unwitnessed fall - suspect syncope, Rt femur fx - s/p ORIF on  complicated by post-op leukocytosis, anemia, Afib with RVR, acute encephalopathy. Pt seen on the floor in Afib, max HR in 170s, somehwat altered and on non rebreather. Cardizem 20mg was given and with cardizem gtt, she entered a sinus rhythm. Two hours later pt became more hypoxic, saturating 91% on non rebreather. She has a rising white count, afebrile, no productive cough or chest pain. Mental status A/O-1-2. Bedside ultrasound did not display evidence of pulmonary edema, although she did receive 20mg Lasix prior to scan. Pt to be transported to CT scan for CT angio chest, and come directly to SICU afterwards for acute hypoxic respiratory failure 2/2 possible aspiration PNA, less likely PE since she has been on xarelto. Family has been spoken to about DNR/DNI, team to revist goals of care plans with daughter today.           Vital Signs Last 24 Hrs  T(C): 37.6 (26 Dec 2018 20:00), Max: 37.6 (26 Dec 2018 04:00)  T(F): 99.7 (26 Dec 2018 20:00), Max: 99.7 (26 Dec 2018 04:00)  HR: 88 (26 Dec 2018 23:20) (88 - 126)  BP: 112/65 (26 Dec 2018 23:00) (83/41 - 115/91)  BP(mean): 77 (26 Dec 2018 23:00) (47 - 96)  RR: 20 (26 Dec 2018 23:20) (17 - 25)  SpO2: 94% (26 Dec 2018 23:20) (93% - 100%)    Physical Exam:  General Appearance: Appears well, NAD  Respiratory: No labored breathing  CV: Pulse regularly present  Abdomen: Soft, nontense      LABS:                        8.2    10.49 )-----------( 299      ( 26 Dec 2018 15:00 )             27.2     12-    140  |  99  |  32<H>  ----------------------------<  208<H>  3.6   |  29  |  0.72    Ca    8.7      26 Dec 2018 04:00  Phos  3.4       Mg     1.9           PT/INR - ( 26 Dec 2018 02:50 )   PT: 15.6 SEC;   INR: 1.39          PTT - ( 26 Dec 2018 02:50 )  PTT:27.3 SEC  Urinalysis Basic - ( 25 Dec 2018 16:23 )    Color: LIGHT YELLOW / Appearance: CLEAR / S.015 / pH: 5.5  Gluc: NEGATIVE / Ketone: SMALL  / Bili: NEGATIVE / Urobili: NORMAL   Blood: NEGATIVE / Protein: NEGATIVE / Nitrite: NEGATIVE   Leuk Esterase: MODERATE / RBC: 0-2 / WBC >50   Sq Epi: FEW / Non Sq Epi: x / Bacteria: SMALL        INs and OUTs:    18 @ 07:  -  18 @ 07:00  --------------------------------------------------------  IN: 2490 mL / OUT: 400 mL / NET: 2090 mL    18 @ 07:  -  18 @ 00:03  --------------------------------------------------------  IN: 1690 mL / OUT: 0 mL / NET: 1690 mL SICU Progress Note    24 HOUR EVENTS: No acute events overnight. Patient continued on cardizem drip. HR in low 100's with normal blood pressure.     HISTORY  89F Afib on xarelto s/p PPM, Dementia presents with unwitnessed fall - suspect syncope, Rt femur fx - s/p ORIF on  complicated by post-op leukocytosis, anemia, Afib with RVR, acute encephalopathy. Pt seen on the floor in Afib, max HR in 170s, somehwat altered and on non rebreather. Cardizem 20mg was given and with cardizem gtt, she entered a sinus rhythm. Two hours later pt became more hypoxic, saturating 91% on non rebreather. She has a rising white count, afebrile, no productive cough or chest pain. Mental status A/O-1-2. Bedside ultrasound did not display evidence of pulmonary edema, although she did receive 20mg Lasix prior to scan. Pt to be transported to CT scan for CT angio chest, and come directly to SICU afterwards for acute hypoxic respiratory failure 2/2 possible aspiration PNA, less likely PE since she has been on xarelto. Family has been spoken to about DNR/DNI, team to revist goals of care plans with daughter.      Vital Signs Last 24 Hrs  T(C): 37.6 (26 Dec 2018 20:00), Max: 37.6 (26 Dec 2018 04:00)  T(F): 99.7 (26 Dec 2018 20:00), Max: 99.7 (26 Dec 2018 04:00)  HR: 88 (26 Dec 2018 23:20) (88 - 126)  BP: 112/65 (26 Dec 2018 23:00) (83/41 - 115/91)  BP(mean): 77 (26 Dec 2018 23:00) (47 - 96)  RR: 20 (26 Dec 2018 23:20) (17 - 25)  SpO2: 94% (26 Dec 2018 23:20) (93% - 100%)    Physical Exam:  Neuro: awake, AO x1  General Appearance: Appears well, NAD  Respiratory: No labored breathing  CV: Pulse regularly present  Abdomen: Soft, nontense      LABS:                        8.2    10.49 )-----------( 299      ( 26 Dec 2018 15:00 )             27.2     12-    140  |  99  |  32<H>  ----------------------------<  208<H>  3.6   |  29  |  0.72    Ca    8.7      26 Dec 2018 04:00  Phos  3.4       Mg     1.9           PT/INR - ( 26 Dec 2018 02:50 )   PT: 15.6 SEC;   INR: 1.39          PTT - ( 26 Dec 2018 02:50 )  PTT:27.3 SEC  Urinalysis Basic - ( 25 Dec 2018 16:23 )    Color: LIGHT YELLOW / Appearance: CLEAR / S.015 / pH: 5.5  Gluc: NEGATIVE / Ketone: SMALL  / Bili: NEGATIVE / Urobili: NORMAL   Blood: NEGATIVE / Protein: NEGATIVE / Nitrite: NEGATIVE   Leuk Esterase: MODERATE / RBC: 0-2 / WBC >50   Sq Epi: FEW / Non Sq Epi: x / Bacteria: SMALL        INs and OUTs:    18 @ 07:  -  18 @ 07:00  --------------------------------------------------------  IN: 2490 mL / OUT: 400 mL / NET: 2090 mL    18 @ 07:  -  18 @ 00:03  --------------------------------------------------------  IN: 1690 mL / OUT: 0 mL / NET: 1690 mL

## 2018-12-27 NOTE — PROCEDURE NOTE - ADDITIONAL PROCEDURE DETAILS
POCUS of Lungs performed to assess fluid status. A-line predominant on right, B-lined on left. B/l pleural effusions present. Nl lung sliding

## 2018-12-27 NOTE — DIETITIAN INITIAL EVALUATION ADULT. - OTHER INFO
Pt seen for critical care nutrition LOS.  Pt admitted w/ dx of closed fracture neck of R femu.  S/P surgery for intramedullary insertion of intertrochanteric antegrade nail into hip.  S/P bedside swallow evaluation this morning-recommendation for Puree diet w/Honey Consistency fluids.

## 2018-12-27 NOTE — PROGRESS NOTE ADULT - ASSESSMENT
89F Afib on xarelto s/p PPM, dementia presents with unwitnessed fall - suspect syncope, Rt femur fx - s/p ORIF on 12/18 complicated by post-op leukocytosis, anemia, Afib with RVR, acute encephalopathy. Admitted to SICU with Acute Hypoxic Respiratory Failure with Afib with RVR.     Neuro  -history of seizures, was on keppra, now withheld given it was likely an episode of syncope resulting from afib? Holding Remeron given a decline in mental status  -tylenol prn for pain control   -consider post-op delerium vs. metabolic encephalopthy in a setting of sepsis 2/2 asp pna    Resp  -saturating >90% on non rebreather, wean as tolerated  -CXR displayed trace bilateral pleural effusions  -revisit plans with daughter for possible DNI status  -Pending CT angio chest for possible Asp PNA, r/o PE    Cardiac  -cont cardizem gtt with HR goal 110  - Will transition to oral medications  -vitals stable     GI  -NPO for now given history of aspiration   -bowel regimen of senna and colace    Renal  -bladder scan at bedside did not show signs of retention  -maintenance fluids at 50ml/hr    Heme  -cont xarelto for Afib    MSK:   -PT   -OOB to chair     ID  -continue zosyn for HCAP, d/c ceftriaxone  -start vanco qd  -f/u urine culture, blood cx 89F Afib on xarelto s/p PPM, dementia presents with unwitnessed fall - suspected syncope, Rt femur fx - s/p ORIF on 12/18 complicated by post-op leukocytosis, anemia, Afib with RVR, acute encephalopathy. Admitted to SICU with Acute Hypoxic Respiratory Failure with Afib with RVR.     Neuro  -history of seizures, was on keppra, now withheld given it was likely an episode of syncope resulting from afib? Holding Remeron given a decline in mental status  -tylenol prn for pain control   -consider post-op delerium vs. metabolic encephalopthy in a setting of sepsis 2/2 asp pna    Resp  -saturating >90% on non rebreather, wean as tolerated  -CXR displayed trace bilateral pleural effusions  -revisit plans with daughter for possible DNI status  -CT angio chest for possible Asp PNA, r/o PE - negative     Cardiac  -will transition from cardizem gtt to PO for HR goal <110  -continue digoxin  -will d/c lopressor  -EKG today  -vitals stable     GI  -NPO, will transition to puree diet  -bowel regimen of senna and colace    Renal  -bladder scan at bedside did not show signs of retention  -maintenance fluids at 50ml/hr    Heme  -cont xarelto for Afib; will f/u w/ Cardiology for potential d/c    MSK  -PT consult  -OOB to chair     ID  -elevated WBC attributed to reactive leukocytosis   -d/c zosyn, vanco  -prelim urine culture, blood cx demonstrate no growth to date    Dispo:   Critical care diagnoses: afib w/ RVR, dementia, femoral fracture

## 2018-12-27 NOTE — PROGRESS NOTE ADULT - SUBJECTIVE AND OBJECTIVE BOX
Orthopedic Surgery Progress Note  Pain well controlled.  No chest pain, shortness of breath, light-headedness.  Patient more communicative this AM  On 2L O2 and satting well  Weaning off cardizem drip    O:  Vital Signs Last 24 Hrs  T(C): 37.2 (27 Dec 2018 04:00), Max: 37.6 (26 Dec 2018 20:00)  T(F): 99 (27 Dec 2018 04:00), Max: 99.7 (26 Dec 2018 20:00)  HR: 95 (27 Dec 2018 04:00) (88 - 119)  BP: 99/57 (27 Dec 2018 04:00) (84/47 - 115/91)  BP(mean): 66 (27 Dec 2018 04:00) (47 - 96)  RR: 26 (27 Dec 2018 04:00) (17 - 26)  SpO2: 100% (27 Dec 2018 04:00) (93% - 100%)    Gen: NAD  RLE  incisions C/D/I  EHL/FHL/TA/GS intact  SILT DP/SP/GRIFFIN/Sa  WWP distally    Labs:                        7.4    8.90  )-----------( 315      ( 27 Dec 2018 03:30 )             25.4                         6.6    9.63  )-----------( 304      ( 27 Dec 2018 02:15 )             22.4     12-27    134<L>  |  100  |  25<H>  ----------------------------<  429<H>  4.5   |  27  |  0.62    PT/INR - ( 26 Dec 2018 02:50 )   PT: 15.6 SEC;   INR: 1.39       PTT - ( 26 Dec 2018 02:50 )  PTT:27.3 SEC    A/P 89y year old female POD9 s/p Intramedullary insertion of intertrochanteric antegrade nail into hip    Pain Control  DVT PPX  PT/OOB  WBAT   Dispo Planning  Empiric abx for suspected PNA  Wean cardizem gtt as tolerated, digitalis and lopressor  Care per SICU    Vinny Rubi MD

## 2018-12-27 NOTE — CONSULT NOTE ADULT - PROBLEM SELECTOR RECOMMENDATION 9
- Patient is s/p hip fracture and ORIF.   - Supportive care.  - Non aggressive interventions as per family.

## 2018-12-27 NOTE — SWALLOW BEDSIDE ASSESSMENT ADULT - COMMENTS
89F Afib on xarelto s/p PPM, dementia presents with unwitnessed fall - suspect syncope, Rt femur fx - s/p ORIF on 12/18 complicated by post-op leukocytosis, anemia, Afib with RVR, acute encephalopathy. Admitted to SICU with Acute Hypoxic Respiratory Failure with Afib with RVR.    Patient seen at bedside, alert and oriented to self only. Patient is able to follow simple commands and express self.

## 2018-12-27 NOTE — PROGRESS NOTE ADULT - ASSESSMENT
afib with RVR  acute hypoxic respiratory disease     improving clinically  agree with weaning off cardizem while dig load

## 2018-12-27 NOTE — CONSULT NOTE ADULT - PROBLEM SELECTOR RECOMMENDATION 5
- Patient is already DNR by paperwork.  - Would recommend MOLST form on discharge.  - Attempted to reach out to daughter, without call back so far.

## 2018-12-27 NOTE — CONSULT NOTE ADULT - SUBJECTIVE AND OBJECTIVE BOX
HPI:  89y Female presents s/p unwitnessed fall c/o severe R hip pain and inability to ambulate.  Patient denies radiation of pain. Patient denies numbness/tingling/burning in the RLE. No other bone/joint complaints. Patient is a community ambulator at baseline with assistive devices. Patient has h/o dementia and L femur IMN done at outside hospital two years ago. Patient also takes Xarelto for Afib, last dose yesterday morning.    PAST MEDICAL & SURGICAL HISTORY:  Varicose Veins of Lower Extremities  MVP (Mitral Valve Prolapse)  Atrial Fibrillation  Hyperlipidemia  IBS (Irritable Bowel Syndrome)  Abdominal Adhesions: removal  S/P Tonsillectomy    MEDICATIONS  (STANDING):  morphine  - Injectable 1 milliGRAM(s) IV Push Once  sodium chloride 0.9%. 1000 milliLiter(s) (125 mL/Hr) IV Continuous <Continuous>    MEDICATIONS  (PRN):  acetaminophen   Tablet .. 650 milliGRAM(s) Oral every 8 hours PRN Temp greater or equal to 38C (100.4F), Mild Pain (1 - 3)  morphine  - Injectable 1 milliGRAM(s) IV Push every 2 hours PRN Breakthrough  oxyCODONE    IR 2.5 milliGRAM(s) Oral every 4 hours PRN Moderate Pain (4 - 6)  oxyCODONE    IR 5 milliGRAM(s) Oral every 4 hours PRN Severe Pain (7 - 10)    ITEMS NOT CHECKED ARE NOT PRESENT    SOCIAL HISTORY:   Significant other/partner:  [ ]  Children:  [ ]  Church/Spirituality:  Substance hx:  [ ]   Tobacco hx:  [ ]   Alcohol hx: [ ]   Home Opioid hx:  [ ] I-Stop Reference No:  Living Situation: [ ]Home  [ ]Long term care  [ ]Rehab [ ]Other    ADVANCE DIRECTIVES:    DNR  Yes  MOLST  [ ]  Living Will  [ ]   DECISION MAKER(s):  [ ] Health Care Proxy(s)  [ ] Surrogate(s)  [ ] Guardian           Name(s): Phone Number(s):    BASELINE (I)ADL(s) (prior to admission):  Allen: [ ]Total  [ ] Moderate [ ]Dependent    Allergies    No Known Allergies    Intolerances    MEDICATIONS  (STANDING):  aspirin  chewable 81 milliGRAM(s) Oral daily  digoxin     Tablet 0.125 milliGRAM(s) Oral daily  diltiazem    Tablet 60 milliGRAM(s) Oral every 8 hours  heparin  Injectable 5000 Unit(s) SubCutaneous every 8 hours  latanoprost 0.005% Ophthalmic Solution 1 Drop(s) Both EYES at bedtime    MEDICATIONS  (PRN):  acetaminophen   Tablet .. 650 milliGRAM(s) Oral every 6 hours PRN Mild Pain (1 - 3)  haloperidol    Injectable 2 milliGRAM(s) IV Push every 6 hours PRN agitation    PRESENT SYMPTOMS: [ ]Unable to obtain due to poor mentation   Source if other than patient:  [ ]Family   [x ]Team     Pain (Impact on QOL):  None at this time  Location -         Minimal acceptable level (0-10 scale):   Aggravating factors -  Quality -  Radiation -  Severity (0-10 scale) -    Timing -    PAIN AD Score:     [email or web address] (press ctrl +  left click to view)    Dyspnea:                           [ ]Mild [ ]Moderate [ ]Severe  Anxiety:                             [ ]Mild [ ]Moderate [ ]Severe  Fatigue:                             [ ]Mild [ ]Moderate [ ]Severe  Nausea:                             [ ]Mild [ ]Moderate [ ]Severe  Loss of appetite:              [ ]Mild [ ]Moderate [ ]Severe  Constipation:                    [ ]Mild [ ]Moderate [ ]Severe    Other Symptoms:  [ x]All other review of systems negative     Karnofsky Performance Score/Palliative Performance Status Version 2:  40       %    http://palliative.info/resource_material/PPSv2.pdf  PHYSICAL EXAM:  Vital Signs Last 24 Hrs  T(C): 37 (28 Dec 2018 12:00), Max: 37.2 (27 Dec 2018 20:00)  T(F): 98.6 (28 Dec 2018 12:00), Max: 99 (27 Dec 2018 20:00)  HR: 99 (28 Dec 2018 12:00) (85 - 104)  BP: 127/77 (28 Dec 2018 12:00) (89/61 - 137/50)  BP(mean): 81 (28 Dec 2018 04:00) (60 - 81)  RR: 20 (28 Dec 2018 12:00) (16 - 26)  SpO2: 90% (28 Dec 2018 12:00) (90% - 99%) I&O's Summary    27 Dec 2018 07:01  -  28 Dec 2018 07:00  --------------------------------------------------------  IN: 875 mL / OUT: 200 mL / NET: 675 mL    GENERAL:  [ x]Alert  [x ]Oriented x 2  [ ]Lethargic  [ ]Cachexia  [ ]Unarousable  [ ]Verbal  [ ]Non-Verbal  Behavioral:   [ ] Anxiety  [x ] Delirium [ ] Agitation [ ] Other  HEENT:  [ ]Normal   [x ]Dry mouth   [ ]ET Tube/Trach  [ ]Oral lesions  PULMONARY:   [ x]Clear [ ]Tachypnea  [ ]Audible excessive secretions   [ ]Rhonchi        [ ]Right [ ]Left [ ]Bilateral  [ ]Crackles        [ ]Right [ ]Left [ ]Bilateral  [ ]Wheezing     [ ]Right [ ]Left [ ]Bilateral  CARDIOVASCULAR:    [x ]Regular [ ]Irregular [ ]Tachy  [ ]Nehemiah [ ]Murmur [ ]Other  GASTROINTESTINAL:  [ x]Soft  [ ]Distended   [x ]+BS  [x ]Non tender [ ]Tender  [ ]PEG [ ]OGT/ NGT  Last BM:   GENITOURINARY:  [ ]Normal [x ] Incontinent   [ ]Oliguria/Anuria   [ ]Martinez  MUSCULOSKELETAL:   [ ]Normal   [x ]Weakness  [ ]Bed/Wheelchair bound [ ]Edema  NEUROLOGIC:   [ ]No focal deficits  [x ] Cognitive impairment  [ ] Dysphagia [ ]Dysarthria [ ] Paresis [ ]Other   SKIN:   [x]Normal   [ ]Pressure ulcer(s)  [ ]Rash    CRITICAL CARE:  [ ] Shock Present  [ ]Septic [ ]Cardiogenic [ ]Neurologic [ ]Hypovolemic  [ ]  Vasopressors [ ]  Inotropes   [ ] Respiratory failure present  [ ] Acute  [ ] Chronic [ ] Hypoxic  [ ] Hypercarbic [ ] Other  [ ] Other organ failure     LABS:                        8.4    8.51  )-----------( 346      ( 28 Dec 2018 02:30 )             27.1   12-28    138  |  101  |  16  ----------------------------<  104<H>  3.6   |  25  |  0.55    Ca    8.6      28 Dec 2018 02:30  Phos  2.6     12-28  Mg     1.7     12-28          RADIOLOGY & ADDITIONAL STUDIES:    PROTEIN CALORIE MALNUTRITION PRESENT: [ ] Yes [ ] No  [ ] PPSV2 < or = to 30% [ ] significant weight loss  [ ] poor nutritional intake [ ] catabolic state [ ] anasarca     Albumin, Serum: 3.7 g/dL (12-18-18 @ 05:10)  Artificial Nutrition [ ]     REFERRALS:   [ ]Chaplaincy  [ ] Hospice  [ ]Child Life  [ ]Social Work  [ ]Case management [ ]Holistic Therapy   Goals of Care Discussion Document:

## 2018-12-27 NOTE — CONSULT NOTE ADULT - PROBLEM SELECTOR RECOMMENDATION 2
- Continue with Tylenol.  - Avoid opiates if possible given delirium.  - Supportive care.   - Patient is awaiting rehab.

## 2018-12-27 NOTE — DIETITIAN INITIAL EVALUATION ADULT. - NS AS NUTRI INTERV MEALS SNACK
1) Initiate oral nutrition w/rec for pureed diet w/honey consistency fluids; 2) Assist pt w/meals and encourage food intake/Texture-modified diet

## 2018-12-27 NOTE — PROGRESS NOTE ADULT - SUBJECTIVE AND OBJECTIVE BOX
Subjective: Patient seen and examined. No new events except as noted.     SUBJECTIVE/ROS:  more awake and alert        MEDICATIONS:  MEDICATIONS  (STANDING):  chlorhexidine 4% Liquid 1 Application(s) Topical once  dextrose 5% + sodium chloride 0.45% with potassium chloride 20 mEq/L 1000 milliLiter(s) (50 mL/Hr) IV Continuous <Continuous>  digoxin  Injectable 0.25 milliGRAM(s) IV Push every 6 hours  diltiazem Infusion 15 mG/Hr (15 mL/Hr) IV Continuous <Continuous>  latanoprost 0.005% Ophthalmic Solution 1 Drop(s) Both EYES at bedtime  metoprolol tartrate Injectable 5 milliGRAM(s) IV Push every 6 hours  piperacillin/tazobactam IVPB. 3.375 Gram(s) IV Intermittent every 8 hours  rivaroxaban 10 milliGRAM(s) Oral every 24 hours  vancomycin  IVPB 1250 milliGRAM(s) IV Intermittent daily      PHYSICAL EXAM:  T(C): 37.2 (12-27-18 @ 04:00), Max: 37.6 (12-26-18 @ 20:00)  HR: 96 (12-27-18 @ 06:00) (88 - 119)  BP: 87/59 (12-27-18 @ 06:00) (84/47 - 115/91)  RR: 21 (12-27-18 @ 06:00) (18 - 26)  SpO2: 98% (12-27-18 @ 06:00) (93% - 100%)  Wt(kg): --  I&O's Summary    26 Dec 2018 07:01  -  27 Dec 2018 07:00  --------------------------------------------------------  IN: 2100 mL / OUT: 0 mL / NET: 2100 mL          JVP: Normal  Neck: supple  Lung: clear   CV: S1 S2 , Murmur:  Abd: soft  Ext: No edema  neuro: Awake   Psych: flat affect  Skin: normal        LABS/DATA:    CARDIAC MARKERS:                                7.4    8.90  )-----------( 315      ( 27 Dec 2018 03:30 )             25.4     12-27    134<L>  |  100  |  25<H>  ----------------------------<  429<H>  4.5   |  27  |  0.62    Ca    7.9<L>      27 Dec 2018 02:15  Phos  2.2     12-27  Mg     1.9     12-27      proBNP:   Lipid Profile:   HgA1c:   TSH:     TELE:  EKG:

## 2018-12-27 NOTE — SWALLOW BEDSIDE ASSESSMENT ADULT - SWALLOW EVAL: RECOMMENDED FEEDING/EATING TECHNIQUES
oral hygiene/allow for swallow between intakes/position upright (90 degrees)/crush medication (when feasible)/maintain upright posture during/after eating for 30 mins/no straws

## 2018-12-27 NOTE — SWALLOW BEDSIDE ASSESSMENT ADULT - SWALLOW EVAL: DIAGNOSIS
Patient presents with OroPharyngeal Stage Dysphagia characterized by reduced oral containment with intermittent anterior loss for thin liquids due to reduced lip seal closure, slow bolus manipulation and transfer for puree with adequate oral clearance. There is laryngeal elevation upon palpation, suspect delayed initiation of the pharyngeal swallow.  There were overt signs of impaired airway protection for Thin Liquids evident by throat clearing response post swallow.  There was no overt signs of impaired airway protection for puree and honey thick liquid trials.

## 2018-12-27 NOTE — SWALLOW BEDSIDE ASSESSMENT ADULT - ORAL PHASE
Decreased anterior-posterior movement of the bolus/Delayed oral transit time Within functional limits suspect premature spillage

## 2018-12-27 NOTE — CONSULT NOTE ADULT - PROBLEM SELECTOR RECOMMENDATION 3
- Likely multifactorial, given hospitalization, pain, debility.  - Avoid benzo's.   - Would decrease Haldol to 1mg for the time being.   - Supportive care.  - Frequent reorientation.

## 2018-12-28 ENCOUNTER — INBOUND DOCUMENT (OUTPATIENT)
Age: 83
End: 2018-12-28

## 2018-12-28 ENCOUNTER — TRANSCRIPTION ENCOUNTER (OUTPATIENT)
Age: 83
End: 2018-12-28

## 2018-12-28 VITALS
HEART RATE: 145 BPM | RESPIRATION RATE: 20 BRPM | SYSTOLIC BLOOD PRESSURE: 127 MMHG | TEMPERATURE: 99 F | OXYGEN SATURATION: 90 % | DIASTOLIC BLOOD PRESSURE: 77 MMHG

## 2018-12-28 DIAGNOSIS — Z51.5 ENCOUNTER FOR PALLIATIVE CARE: ICD-10-CM

## 2018-12-28 DIAGNOSIS — R53.81 OTHER MALAISE: ICD-10-CM

## 2018-12-28 DIAGNOSIS — S72.141A DISPLACED INTERTROCHANTERIC FRACTURE OF RIGHT FEMUR, INITIAL ENCOUNTER FOR CLOSED FRACTURE: ICD-10-CM

## 2018-12-28 DIAGNOSIS — R41.0 DISORIENTATION, UNSPECIFIED: ICD-10-CM

## 2018-12-28 DIAGNOSIS — M25.559 PAIN IN UNSPECIFIED HIP: ICD-10-CM

## 2018-12-28 LAB
BUN SERPL-MCNC: 16 MG/DL — SIGNIFICANT CHANGE UP (ref 7–23)
CALCIUM SERPL-MCNC: 8.6 MG/DL — SIGNIFICANT CHANGE UP (ref 8.4–10.5)
CHLORIDE SERPL-SCNC: 101 MMOL/L — SIGNIFICANT CHANGE UP (ref 98–107)
CO2 SERPL-SCNC: 25 MMOL/L — SIGNIFICANT CHANGE UP (ref 22–31)
CREAT SERPL-MCNC: 0.55 MG/DL — SIGNIFICANT CHANGE UP (ref 0.5–1.3)
GLUCOSE SERPL-MCNC: 104 MG/DL — HIGH (ref 70–99)
HCT VFR BLD CALC: 27.1 % — LOW (ref 34.5–45)
HGB BLD-MCNC: 8.4 G/DL — LOW (ref 11.5–15.5)
MAGNESIUM SERPL-MCNC: 1.7 MG/DL — SIGNIFICANT CHANGE UP (ref 1.6–2.6)
MCHC RBC-ENTMCNC: 29 PG — SIGNIFICANT CHANGE UP (ref 27–34)
MCHC RBC-ENTMCNC: 31 % — LOW (ref 32–36)
MCV RBC AUTO: 93.4 FL — SIGNIFICANT CHANGE UP (ref 80–100)
NRBC # FLD: 0 — SIGNIFICANT CHANGE UP
PHOSPHATE SERPL-MCNC: 2.6 MG/DL — SIGNIFICANT CHANGE UP (ref 2.5–4.5)
PLATELET # BLD AUTO: 346 K/UL — SIGNIFICANT CHANGE UP (ref 150–400)
PMV BLD: 11.1 FL — SIGNIFICANT CHANGE UP (ref 7–13)
POTASSIUM SERPL-MCNC: 3.6 MMOL/L — SIGNIFICANT CHANGE UP (ref 3.5–5.3)
POTASSIUM SERPL-SCNC: 3.6 MMOL/L — SIGNIFICANT CHANGE UP (ref 3.5–5.3)
RBC # BLD: 2.9 M/UL — LOW (ref 3.8–5.2)
RBC # FLD: 14.5 % — SIGNIFICANT CHANGE UP (ref 10.3–14.5)
SODIUM SERPL-SCNC: 138 MMOL/L — SIGNIFICANT CHANGE UP (ref 135–145)
WBC # BLD: 8.51 K/UL — SIGNIFICANT CHANGE UP (ref 3.8–10.5)
WBC # FLD AUTO: 8.51 K/UL — SIGNIFICANT CHANGE UP (ref 3.8–10.5)

## 2018-12-28 PROCEDURE — 71045 X-RAY EXAM CHEST 1 VIEW: CPT | Mod: 26

## 2018-12-28 PROCEDURE — 99232 SBSQ HOSP IP/OBS MODERATE 35: CPT

## 2018-12-28 RX ORDER — POTASSIUM CHLORIDE 20 MEQ
20 PACKET (EA) ORAL ONCE
Qty: 0 | Refills: 0 | Status: DISCONTINUED | OUTPATIENT
Start: 2018-12-28 | End: 2018-12-28

## 2018-12-28 RX ORDER — HEPARIN SODIUM 5000 [USP'U]/ML
5000 INJECTION INTRAVENOUS; SUBCUTANEOUS
Qty: 0 | Refills: 0 | COMMUNITY
Start: 2018-12-28

## 2018-12-28 RX ORDER — DILTIAZEM HCL 120 MG
1 CAPSULE, EXT RELEASE 24 HR ORAL
Qty: 0 | Refills: 0 | COMMUNITY
Start: 2018-12-28

## 2018-12-28 RX ORDER — CARVEDILOL PHOSPHATE 80 MG/1
1 CAPSULE, EXTENDED RELEASE ORAL
Qty: 0 | Refills: 0 | COMMUNITY

## 2018-12-28 RX ORDER — RIVAROXABAN 15 MG-20MG
1 KIT ORAL
Qty: 0 | Refills: 0 | COMMUNITY

## 2018-12-28 RX ORDER — ACETAMINOPHEN 500 MG
2 TABLET ORAL
Qty: 0 | Refills: 0 | COMMUNITY
Start: 2018-12-28

## 2018-12-28 RX ORDER — DIGOXIN 250 MCG
1 TABLET ORAL
Qty: 0 | Refills: 0 | COMMUNITY
Start: 2018-12-28

## 2018-12-28 RX ORDER — DILTIAZEM HCL 120 MG
1 CAPSULE, EXT RELEASE 24 HR ORAL
Qty: 0 | Refills: 0 | COMMUNITY

## 2018-12-28 RX ORDER — ASPIRIN/CALCIUM CARB/MAGNESIUM 324 MG
81 TABLET ORAL DAILY
Qty: 0 | Refills: 0 | Status: DISCONTINUED | OUTPATIENT
Start: 2018-12-28 | End: 2018-12-28

## 2018-12-28 RX ORDER — MAGNESIUM SULFATE 500 MG/ML
2 VIAL (ML) INJECTION ONCE
Qty: 0 | Refills: 0 | Status: COMPLETED | OUTPATIENT
Start: 2018-12-28 | End: 2018-12-28

## 2018-12-28 RX ORDER — POTASSIUM CHLORIDE 20 MEQ
20 PACKET (EA) ORAL ONCE
Qty: 0 | Refills: 0 | Status: COMPLETED | OUTPATIENT
Start: 2018-12-28 | End: 2018-12-28

## 2018-12-28 RX ORDER — LATANOPROST 0.05 MG/ML
1 SOLUTION/ DROPS OPHTHALMIC; TOPICAL
Qty: 0 | Refills: 0 | COMMUNITY
Start: 2018-12-28

## 2018-12-28 RX ORDER — LATANOPROST 0.05 MG/ML
1 SOLUTION/ DROPS OPHTHALMIC; TOPICAL
Qty: 0 | Refills: 0 | COMMUNITY

## 2018-12-28 RX ORDER — ASPIRIN/CALCIUM CARB/MAGNESIUM 324 MG
1 TABLET ORAL
Qty: 0 | Refills: 0 | COMMUNITY
Start: 2018-12-28

## 2018-12-28 RX ADMIN — Medication 50 GRAM(S): at 12:46

## 2018-12-28 RX ADMIN — Medication 81 MILLIGRAM(S): at 12:47

## 2018-12-28 RX ADMIN — HEPARIN SODIUM 5000 UNIT(S): 5000 INJECTION INTRAVENOUS; SUBCUTANEOUS at 14:35

## 2018-12-28 RX ADMIN — Medication 20 MILLIEQUIVALENT(S): at 12:46

## 2018-12-28 RX ADMIN — HEPARIN SODIUM 5000 UNIT(S): 5000 INJECTION INTRAVENOUS; SUBCUTANEOUS at 05:33

## 2018-12-28 RX ADMIN — Medication 0.12 MILLIGRAM(S): at 05:32

## 2018-12-28 NOTE — DISCHARGE NOTE ADULT - HOSPITAL COURSE
ck dig level 1 week 89 yo is s/p above without any intraoperative complications.  Pt is doing well and stable for discharge.  Pt hx afib was in rapid afib postoperatively and placed on cardizem gtt and digoxin until rate controlled.  Pt went to SICU for 2 days for respiratory distress/  suspected PNA antibiotics DC'd. Pt was cleared to floor and cleared by hospitalist and cardiology for discharge.  Pt is tolerating physical therapy: WBAT,, gait training.  Leave dressing ON until office postop visit   Pt is on Xarelto for anticoagulation as well as low dose ASA.  Follow up with surgeon in 1 week call for appt. Please ck digoxin level in 1 week.

## 2018-12-28 NOTE — DISCHARGE NOTE ADULT - INSTRUCTIONS
weight bear as tolerated   resume same diet as prior to surgery   Dr Ventura 1 week call for appt [phone]

## 2018-12-28 NOTE — PROGRESS NOTE ADULT - ASSESSMENT
afib with RVR  HR stable  cont current meds  agree pt is not good a/c candidate given high fall risk   suggest low dose asa

## 2018-12-28 NOTE — PROGRESS NOTE ADULT - PROVIDER SPECIALTY LIST ADULT
Cardiology
Critical Care
Hospitalist
Orthopedics
Rehab Medicine
SICU
Anesthesia
SICU
Hospitalist
89

## 2018-12-28 NOTE — DISCHARGE NOTE ADULT - CARE PROVIDERS DIRECT ADDRESSES
[email or web address],DirectAddress_Unknown

## 2018-12-28 NOTE — PROGRESS NOTE ADULT - PROBLEM SELECTOR PROBLEM 4
Anemia due to blood loss
Preoperative examination
Seizure disorder

## 2018-12-28 NOTE — DISCHARGE NOTE ADULT - CARE PLAN
Principal Discharge DX:	Intertrochanteric fracture of right femur  Goal:	fixation of fracture  Assessment and plan of treatment:	weight bear as tolerated   resume same diet as prior to surgery   Dr Ventura 1 week call for appt [phone]  Secondary Diagnosis:	Atrial fibrillation  Goal:	rate control  Assessment and plan of treatment:	continue current meds  repeat digoxin level 1 week   Private cardiologist vs Dr Joseph

## 2018-12-28 NOTE — PROGRESS NOTE ADULT - SUBJECTIVE AND OBJECTIVE BOX
Patient is a 89y old  Female who presents with a chief complaint of R hip fracture -> s/p IM nail right hip 12/18/18    SUBJECTIVE / OVERNIGHT EVENTS:    Patient reports feeling well, denies pain  Reports tolerating diet  Reports voiding and having BM    Tele: afib max rate 117    MEDICATIONS  (STANDING):  aspirin  chewable 81 milliGRAM(s) Oral daily  digoxin     Tablet 0.125 milliGRAM(s) Oral daily  diltiazem    Tablet 60 milliGRAM(s) Oral every 8 hours  heparin  Injectable 5000 Unit(s) SubCutaneous every 8 hours  latanoprost 0.005% Ophthalmic Solution 1 Drop(s) Both EYES at bedtime    MEDICATIONS  (PRN):  acetaminophen   Tablet .. 650 milliGRAM(s) Oral every 6 hours PRN Mild Pain (1 - 3)  acetaminophen  Suppository .. 650 milliGRAM(s) Rectal every 6 hours PRN Temp greater or equal to 38C (100.4F), Mild Pain (1 - 3)  haloperidol    Injectable 2 milliGRAM(s) IV Push every 6 hours PRN agitation    T(C): 36.7 (12-28-18 @ 08:00), Max: 37.2 (12-27-18 @ 12:00)  HR: 89 (12-28-18 @ 08:00) (83 - 104)  BP: 125/57 (12-28-18 @ 08:00) (89/61 - 137/50)  RR: 20 (12-28-18 @ 08:00) (16 - 27)  SpO2: 95% (12-28-18 @ 08:00) (93% - 99%)    I&O's Summary    27 Dec 2018 07:01  -  28 Dec 2018 07:00  --------------------------------------------------------  IN: 875 mL / OUT: 200 mL / NET: 675 mL    PHYSICAL EXAM:  GENERAL: NAD, very awake  HEAD:  Atraumatic, Normocephalic  EYES: conjunctiva and sclera clear  NECK: Supple, No JVD  CHEST/LUNG: Clear to auscultation bilaterally; No wheeze  HEART: irregular; No murmurs, rubs, or gallops  ABDOMEN: Soft, Nontender, Nondistended; Bowel sounds present  EXTREMITIES:   warm and well perfused, No clubbing, cyanosis, or edema, SCD in place  PSYCH: AAOx1  NEUROLOGY: non-focal  SKIN: R hip c/d/i    LABS:                        8.4    8.51  )-----------( 346      ( 28 Dec 2018 02:30 )             27.1     12-28    138  |  101  |  16  ----------------------------<  104<H>  3.6   |  25  |  0.55    Ca    8.6      28 Dec 2018 02:30  Phos  2.6     12-28  Mg     1.7     12-28       Notes Reviewed:  cards, ortho   Care Discussed with Consultants/Other Providers: ortho PA

## 2018-12-28 NOTE — PROGRESS NOTE ADULT - PROBLEM SELECTOR PLAN 2
s/p ORIF on 12/18.   - Pain control  - On SQH for DVT prophylaxis  - Surgical site management as per ortho. s/p ORIF on 12/18.   - Pain control  - On SQH for DVT prophylaxis, to start xarelto on dc per ortho for ppx  - Surgical site management as per ortho.

## 2018-12-28 NOTE — PROGRESS NOTE ADULT - ATTENDING COMMENTS
Pt seen and examined.  Exam and plan as above
plan of care was d/w ortho, pt to be transferred to SICU after CTPA
pt seen and examined.  Exam and plan as above
Pt seen and examined.  Exam and plan as above
89F Afib on xarelto s/p PPM, dementia presents with unwitnessed fall - suspected syncope, Rt femur fx - s/p ORIF on 12/18 complicated by post-op leukocytosis, anemia, Afib with RVR, acute encephalopathy. Admitted to SICU with Acute Hypoxic Respiratory Failure with Afib with RVR.     Neuro  -history of seizures, was on keppra, now withheld given it was likely an episode of syncope resulting from afib? Holding Remeron given a decline in mental status  -tylenol prn for pain control   -consider post-op delerium vs. metabolic encephalopthy in a setting of sepsis 2/2 asp pna    Resp  -saturating >90% on non rebreather, wean as tolerated  -CXR displayed trace bilateral pleural effusions  -revisit plans with daughter for possible DNI status  -CT angio chest for possible Asp PNA, r/o PE - negative     Cardiac  -will transition from cardizem gtt to PO for HR goal <110  -continue digoxin  -will d/c lopressor  -EKG today  -vitals stable     GI  -NPO, will transition to puree diet  -bowel regimen of senna and colace    Renal  -bladder scan at bedside did not show signs of retention  -maintenance fluids at 50ml/hr    Heme  -cont xarelto for Afib; will f/u w/ Cardiology for potential d/c    MSK  -PT consult  -OOB to chair     ID  -elevated WBC attributed to reactive leukocytosis   -d/c zosyn, vanco  -prelim urine culture, blood cx demonstrate no growth to date    Dispo:   Critical care diagnoses: afib w/ RVR, dementia, femoral fracture    The patient is a critical care patient with life threatening hemodynamic and metabolic instability in SICU.  I have personally interviewed when possible and examined the patient, reviewed data and laboratory tests/x-rays and all pertinent electronic images.  I was physically present for the key portions of the evaluation and management (E/M) service provided.   The SICU team has a constant risk benefit analyzes discussion with the primary team, all consultants, House Staff and PA's on all decisions.  These diagnoses are unrelated to the surgical procedure noted above.  I meet with family if needed to get further history, discuss the case and make care decisions for this patient who might not be able to participate.  Time involved in performance of separately billable procedures was not counted toward my critical care time. There is no overlap.  I spent 55-75 minutes of critical care time for the diagnoses, assessment, plan and interventions.
Seen and examined, chart and note reviewed, case discussed with SICU team    Atrial fibrillation in RVR  a.  On cardizem gtt  b.  Start IV lopressor  c.  On zarelto    Sepsis secondary to HCAP  a.  Continue zosyn  b.  Start vancomycin  c.  DC Ceftriaxone      At risk for malnutrition  a.  Bedside swallow  b.  May need enteral feeding via FT  Anemia  a.  Stable    Spent 30 minutes in critical care
Ryan Stringer MD  pager# [phone]
Ryan Stringer MD  pager# [phone]
Ryan Stringer MD  pager# [phone]
Ryan Stringer MD  pager# [phone]
d/w son at bedside

## 2018-12-28 NOTE — PROGRESS NOTE ADULT - PROBLEM SELECTOR PLAN 5
Upon discussion with the daughter, unsure if her LOC episode months ago is related to seizure.  Keppra placed by neurologist when syncope work up was negative at the time, but based on current issue with Afib w/ RVR, it could've been triggered by RVR.     - OP neuro f/u  - can c/w Keppra until d/w neurology as OP Per last hospitalist d/w daughter, unsure if her LOC episode months ago is related to seizure.  Keppra placed by neurologist when syncope work up was negative at the time, but based on current issue with Afib w/ RVR, it could've been triggered by RVR.     - OP neuro f/u  - can c/w Keppra until d/w neurology as OP

## 2018-12-28 NOTE — DISCHARGE NOTE ADULT - PATIENT PORTAL LINK FT
You can access the "[employer] Patient Portal, offered by [facility], by registering with the following website: [email or web address]

## 2018-12-28 NOTE — PROGRESS NOTE ADULT - SUBJECTIVE AND OBJECTIVE BOX
SICU Progress Note    24 HOUR EVENTS:     Pt was started on Digoxin yesterday , transitioned to PO Cardizem , Lopresor was discontinued . Off xeralto . Diet  advanced to Soft Diet . Antibiotic regimen was discontinued . .Folow up Blood Cx : 48 hrs : NGTD , Urine midtsream was NGTD for 24 hrs . Sating above 95 % . SBP:     HISTORY  89F Afib on xarelto s/p PPM, Dementia presents with unwitnessed fall - suspect syncope, Rt femur fx - s/p ORIF on  complicated by post-op leukocytosis, anemia, Afib with RVR, acute encephalopathy. Pt seen on the floor in Afib, max HR in 170s, somehwat altered and on non rebreather. Cardizem 20mg was given and with cardizem gtt, she entered a sinus rhythm. Two hours later pt became more hypoxic, saturating 91% on non rebreather. She has a rising white count, afebrile, no productive cough or chest pain. Mental status A/O-1-2. Bedside ultrasound did not display evidence of pulmonary edema, although she did receive 20mg Lasix prior to scan. Pt to be transported to CT scan for CT angio chest, and come directly to SICU afterwards for acute hypoxic respiratory failure 2/2 possible aspiration PNA, less likely PE since she has been on xarelto. Family has been spoken to about DNR/DNI, team to revist goals of care plans with daughter.      Vital Signs Last 24 Hrs  T(C): 37.2 (18 @ 20:00), Max: 37.2 (18 @ 04:00)  HR: 85 (18 @ 21:00) (82 - 104)  BP: 119/45 (18 @ 21:00) (83/64 - 119/65)  BP(mean): 64 (18 @ 21:00) (51 - 80)  ABP: --  ABP(mean): --  RR: 22 (18 @ 21:00) (16 - 27)  SpO2: 97% (18 @ 21:00) (93% - 100%)  Wt(kg): --  CVP(mm Hg): --  CI: --  CAPILLARY BLOOD GLUCOSE      POCT Blood Glucose.: 131 mg/dL (27 Dec 2018 07:17)   N/A       @ 07: @ 07:00  --------------------------------------------------------  IN:    dextrose 5% + sodium chloride 0.45% with potassium chloride 20 mEq/L: 1150 mL    dextrose 5% + sodium chloride 0.45%.: 50 mL    diltiazem Infusion: 305 mL    IV PiggyBack: 650 mL  Total IN: 2155 mL    OUT:  Total OUT: 0 mL    Total NET: 2155 mL       @ 07: @ 00:19  --------------------------------------------------------  IN:    dextrose 5% + sodium chloride 0.45% with potassium chloride 20 mEq/L: 475 mL    diltiazem Infusion: 25 mL    diltiazem Infusion: 5 mL    IV PiggyBack: 100 mL    Oral Fluid: 170 mL  Total IN: 775 mL    OUT:    Voided: 200 mL  Total OUT: 200 mL    Total NET: 575 mL        Physical Exam:  Neuro: awake, AO x1  General Appearance: Appears well, NAD  Respiratory: No labored breathing  CV: Pulse regularly present  Abdomen: Soft, nontense      LABS:                        8.2    10.49 )-----------( 299      ( 26 Dec 2018 15:00 )             27.2         140  |  99  |  32<H>  ----------------------------<  208<H>  3.6   |  29  |  0.72    Ca    8.7      26 Dec 2018 04:00  Phos  3.4       Mg     1.9           PT/INR - ( 26 Dec 2018 02:50 )   PT: 15.6 SEC;   INR: 1.39          PTT - ( 26 Dec 2018 02:50 )  PTT:27.3 SEC  Urinalysis Basic - ( 25 Dec 2018 16:23 )    Color: LIGHT YELLOW / Appearance: CLEAR / S.015 / pH: 5.5  Gluc: NEGATIVE / Ketone: SMALL  / Bili: NEGATIVE / Urobili: NORMAL   Blood: NEGATIVE / Protein: NEGATIVE / Nitrite: NEGATIVE   Leuk Esterase: MODERATE / RBC: 0-2 / WBC >50   Sq Epi: FEW / Non Sq Epi: x / Bacteria: SMALL        INs and OUTs:    18 @ 07:01  -  18 @ 07:00  --------------------------------------------------------  IN: 2490 mL / OUT: 400 mL / NET: 2090 mL    18 @ 07:01  -  18 @ 00:03  --------------------------------------------------------  IN: 1690 mL / OUT: 0 mL / NET: 1690 mL SICU Progress Note    24 HOUR EVENTS:     Pt was started on Digoxin yesterday , transitioned to PO Cardizem , Lopresor was discontinued . Off xeralto . Diet  advanced to Soft Diet . Antibiotic regimen was discontinued . .Folow up Blood Cx : 48 hrs : NGTD , Urine midtsream was NGTD for 24 hrs . Sating above 95 % . SBP:     HISTORY  89F Afib on xarelto s/p PPM, Dementia presents with unwitnessed fall - suspect syncope, Rt femur fx - s/p ORIF on  complicated by post-op leukocytosis, anemia, Afib with RVR, acute encephalopathy. Pt seen on the floor in Afib, max HR in 170s, somehwat altered and on non rebreather. Cardizem 20mg was given and with cardizem gtt, she entered a sinus rhythm. Two hours later pt became more hypoxic, saturating 91% on non rebreather. She has a rising white count, afebrile, no productive cough or chest pain. Mental status A/O-1-2. Bedside ultrasound did not display evidence of pulmonary edema, although she did receive 20mg Lasix prior to scan. Pt to be transported to CT scan for CT angio chest, and come directly to SICU afterwards for acute hypoxic respiratory failure 2/2 possible aspiration PNA, less likely PE since she has been on xarelto. Family has been spoken to about DNR/DNI, team to revist goals of care plans with daughter.      Vital Signs Last 24 Hrs  T(C): 37.2 (18 @ 20:00), Max: 37.2 (18 @ 04:00)  HR: 85 (18 @ 21:00) (82 - 104)  BP: 119/45 (18 @ 21:00) (83/64 - 119/65)  BP(mean): 64 (18 @ 21:00) (51 - 80)  ABP: --  ABP(mean): --  RR: 22 (18 @ 21:00) (16 - 27)  SpO2: 97% (18 @ 21:00) (93% - 100%)  Wt(kg): --  CVP(mm Hg): --  CI: --  CAPILLARY BLOOD GLUCOSE      POCT Blood Glucose.: 131 mg/dL (27 Dec 2018 07:17)   N/A       @ 07: @ 07:00  --------------------------------------------------------  IN:    dextrose 5% + sodium chloride 0.45% with potassium chloride 20 mEq/L: 1150 mL    dextrose 5% + sodium chloride 0.45%.: 50 mL    diltiazem Infusion: 305 mL    IV PiggyBack: 650 mL  Total IN: 2155 mL    OUT:  Total OUT: 0 mL    Total NET: 2155 mL       @ 07: @ 00:19  --------------------------------------------------------  IN:    dextrose 5% + sodium chloride 0.45% with potassium chloride 20 mEq/L: 475 mL    diltiazem Infusion: 25 mL    diltiazem Infusion: 5 mL    IV PiggyBack: 100 mL    Oral Fluid: 170 mL  Total IN: 775 mL    OUT:    Voided: 200 mL  Total OUT: 200 mL    Total NET: 575 mL        Physical Exam:  Neuro: awake, AO x1  General Appearance: Appears well, NAD  Respiratory: No labored breathing  CV: Pulse regularly present  Abdomen: Soft, nontense , non tender       LABS:                        8.2    10.49 )-----------( 299      ( 26 Dec 2018 15:00 )             27.2         140  |  99  |  32<H>  ----------------------------<  208<H>  3.6   |  29  |  0.72    Ca    8.7      26 Dec 2018 04:00  Phos  3.4       Mg     1.9           PT/INR - ( 26 Dec 2018 02:50 )   PT: 15.6 SEC;   INR: 1.39          PTT - ( 26 Dec 2018 02:50 )  PTT:27.3 SEC  Urinalysis Basic - ( 25 Dec 2018 16:23 )    Color: LIGHT YELLOW / Appearance: CLEAR / S.015 / pH: 5.5  Gluc: NEGATIVE / Ketone: SMALL  / Bili: NEGATIVE / Urobili: NORMAL   Blood: NEGATIVE / Protein: NEGATIVE / Nitrite: NEGATIVE   Leuk Esterase: MODERATE / RBC: 0-2 / WBC >50   Sq Epi: FEW / Non Sq Epi: x / Bacteria: SMALL        INs and OUTs:    18 @ 07:01  -  18 @ 07:00  --------------------------------------------------------  IN: 2490 mL / OUT: 400 mL / NET: 2090 mL    18 @ 07:01  -  18 @ 00:03  --------------------------------------------------------  IN: 1690 mL / OUT: 0 mL / NET: 1690 mL

## 2018-12-28 NOTE — DISCHARGE NOTE ADULT - MEDICATION SUMMARY - MEDICATIONS TO TAKE
I will START or STAY ON the medications listed below when I get home from the hospital:    acetaminophen 325 mg oral tablet  -- 2 tab(s) by mouth every 6 hours, As needed, Mild Pain (1 - 3)  -- Indication: For Pain    digoxin 125 mcg (0.125 mg) oral tablet  -- 1 tab(s) by mouth once a day  -- Indication: For A fib     dilTIAZem 60 mg oral tablet  -- 1 tab(s) by mouth every 8 hours  -- Indication: For A fib    Xarelto 10 mg oral tablet  -- 1 tab(s) by mouth once a day  -- Indication: For Prevention of blood clots / Afib     Keppra 500 mg oral tablet  -- 1 tab(s) by mouth 2 times a day  -- Indication: For home med    mirtazapine 7.5 mg oral tablet  -- 1 tab(s) by mouth once a day (at bedtime)  -- Indication: For home med    Coreg 12.5 mg oral tablet  -- 1 tab(s) by mouth 2 times a day  -- Indication: For home med    Colace 100 mg oral capsule  -- 3 cap(s) by mouth once a day  -- Indication: For Stool softener    Senna 8.6 mg oral tablet  -- 2 tab(s) by mouth once a day (at bedtime)  -- Indication: For Stool softener    latanoprost 0.005% ophthalmic solution  -- 1 drop(s) to each affected eye once a day (at bedtime)  -- Indication: For home med I will START or STAY ON the medications listed below when I get home from the hospital:    acetaminophen 325 mg oral tablet  -- 2 tab(s) by mouth every 6 hours, As needed, Mild Pain (1 - 3)  -- Indication: For Pain    aspirin 81 mg oral tablet, chewable  -- 1 tab(s) by mouth once a day  -- Indication: For Antiplatelet    digoxin 125 mcg (0.125 mg) oral tablet  -- 1 tab(s) by mouth once a day  -- Indication: For A fib     dilTIAZem 60 mg oral tablet  -- 1 tab(s) by mouth every 8 hours  -- Indication: For A fib    Xarelto 10 mg oral tablet  -- 1 tab(s) by mouth once a day  -- Indication: For Prevention of blood clots / Afib     Keppra 500 mg oral tablet  -- 1 tab(s) by mouth 2 times a day  -- Indication: For home med    mirtazapine 7.5 mg oral tablet  -- 1 tab(s) by mouth once a day (at bedtime)  -- Indication: For home med    Coreg 12.5 mg oral tablet  -- 1 tab(s) by mouth 2 times a day  -- Indication: For home med    Colace 100 mg oral capsule  -- 3 cap(s) by mouth once a day  -- Indication: For Stool softener    Senna 8.6 mg oral tablet  -- 2 tab(s) by mouth once a day (at bedtime)  -- Indication: For Stool softener    latanoprost 0.005% ophthalmic solution  -- 1 drop(s) to each affected eye once a day (at bedtime)  -- Indication: For home med I will START or STAY ON the medications listed below when I get home from the hospital:    acetaminophen 325 mg oral tablet  -- 2 tab(s) by mouth every 6 hours, As needed, Mild Pain (1 - 3)  -- Indication: For Pain    aspirin 81 mg oral tablet, chewable  -- 1 tab(s) by mouth once a day  -- Indication: For Antiplatelet    digoxin 125 mcg (0.125 mg) oral tablet  -- 1 tab(s) by mouth once a day  -- Indication: For A fib     dilTIAZem 60 mg oral tablet  -- 1 tab(s) by mouth every 8 hours  -- Indication: For A fib    heparin  -- 5000 unit(s) subcutaneous every 8 hours  -- Indication: For Prevention of blood clots    Keppra 500 mg oral tablet  -- 1 tab(s) by mouth 2 times a day  -- Indication: For home med    mirtazapine 7.5 mg oral tablet  -- 1 tab(s) by mouth once a day (at bedtime)  -- Indication: For home med    Colace 100 mg oral capsule  -- 3 cap(s) by mouth once a day  -- Indication: For Stool softener    Senna 8.6 mg oral tablet  -- 2 tab(s) by mouth once a day (at bedtime)  -- Indication: For Stool softener    latanoprost 0.005% ophthalmic solution  -- 1 drop(s) to each affected eye once a day (at bedtime)  -- Indication: For home med

## 2018-12-28 NOTE — DISCHARGE NOTE ADULT - MEDICATION SUMMARY - MEDICATIONS TO STOP TAKING
I will STOP taking the medications listed below when I get home from the hospital:    Xarelto 10 mg oral tablet  -- 1 tab(s) by mouth once a day I will STOP taking the medications listed below when I get home from the hospital:    Coreg 12.5 mg oral tablet  -- 1 tab(s) by mouth 2 times a day    Xarelto 10 mg oral tablet  -- 1 tab(s) by mouth once a day

## 2018-12-28 NOTE — PROGRESS NOTE ADULT - REASON FOR ADMISSION
R hip IT fx
R hip pain

## 2018-12-28 NOTE — PROGRESS NOTE ADULT - PROBLEM SELECTOR PLAN 4
Acute blood loss anemia after surgery.  Pre-op 13-->8.4, no transfusion given.    - monitor H/H  - no clinical indication for PRBC transfusion today
Cardiac clearance to be done by the cardiologist.  Will continue to monitor.  Awaiting second set of troponin, TTE.
Upon discussion with the daughter, unsure if her LOC episode months ago is related to seizure.  Keppra placed by neurologist when syncope work up was negative at the time, but based on current issue with Afib w/ RVR, it could've been triggered by RVR.  Will monitor off Keppra for now
Upon discussion with the daughter, unsure if her LOC episode months ago is related to seizure.  Keppra placed by neurologist when syncope work up was negative at the time, but based on current issue with Afib w/ RVR, it could've been triggered by RVR.  Will monitor off Keppra for now as patient had been far too sedated in post-op.
Upon discussion with the daughter, unsure if her LOC episode months ago is related to seizure.  Keppra placed by neurologist when syncope work up was negative at the time, but based on current issue with Afib w/ RVR, it could've been triggered by RVR.  Will monitor off Keppra for now

## 2018-12-28 NOTE — DISCHARGE NOTE ADULT - MEDICATION SUMMARY - MEDICATIONS TO CHANGE
I will SWITCH the dose or number of times a day I take the medications listed below when I get home from the hospital:    Cardizem  mg/24 hours oral capsule, extended release  -- 1 cap(s) by mouth once a day

## 2018-12-28 NOTE — DISCHARGE NOTE ADULT - CARE PROVIDER_API CALL
Oskar Ventura), Orthopaedic Surgery  [address]  [place] [address]  Phone: [phone]  Fax: [phone]    Sg Joseph), Cardiovascular Disease; Internal Medicine  [address]  [place] [address]  Phone: [phone]  Fax: [phone]

## 2018-12-28 NOTE — PROGRESS NOTE ADULT - PROBLEM SELECTOR PROBLEM 3
Closed nondisplaced intertrochanteric fracture of right femur, initial encounter
Dementia without behavioral disturbance, unspecified dementia type
Dementia without behavioral disturbance, unspecified dementia type
Closed nondisplaced intertrochanteric fracture of right femur, initial encounter

## 2018-12-28 NOTE — PROGRESS NOTE ADULT - PROBLEM SELECTOR PROBLEM 1
Chronic atrial fibrillation
Closed nondisplaced intertrochanteric fracture of right femur, initial encounter
Dementia without behavioral disturbance, unspecified dementia type
Persistent atrial fibrillation

## 2018-12-28 NOTE — PROGRESS NOTE ADULT - PROBLEM SELECTOR PLAN 1
complicated by RVR, off Cardizem gtt; CTPA negative for PE  - c/w cardizem & dig  - rate controlled on tele  - appreciate cards f/u  - replete Mg to goal 2 and K to goal 4  - per cards high risk for ac recommending asa 81 mg complicated by RVR, off Cardizem gtt; CTPA negative for PE  - c/w cardizem & dig  - d/w ortho PA re: coreg and asking cards if should be dc off it as no longer taking here  - rate controlled on tele  - appreciate cards f/u  - replete Mg to goal 2 and K to goal 4  - per cards high risk for ac recommending asa 81 mg

## 2018-12-28 NOTE — PROGRESS NOTE ADULT - SUBJECTIVE AND OBJECTIVE BOX
Subjective: Patient seen and examined. No new events except as noted.     SUBJECTIVE/ROS:  feels ok       MEDICATIONS:  MEDICATIONS  (STANDING):  digoxin     Tablet 0.125 milliGRAM(s) Oral daily  diltiazem    Tablet 60 milliGRAM(s) Oral every 8 hours  heparin  Injectable 5000 Unit(s) SubCutaneous every 8 hours  latanoprost 0.005% Ophthalmic Solution 1 Drop(s) Both EYES at bedtime      PHYSICAL EXAM:  T(C): 37.1 (12-28-18 @ 04:00), Max: 37.2 (12-27-18 @ 12:00)  HR: 90 (12-28-18 @ 04:00) (82 - 104)  BP: 119/55 (12-28-18 @ 05:00) (89/61 - 137/50)  RR: 20 (12-28-18 @ 05:00) (16 - 27)  SpO2: 96% (12-28-18 @ 05:00) (93% - 100%)  Wt(kg): --  I&O's Summary    27 Dec 2018 07:01  -  28 Dec 2018 07:00  --------------------------------------------------------  IN: 875 mL / OUT: 200 mL / NET: 675 mL          JVP: Normal  Neck: supple  Lung: clear   CV: S1 S2 , Murmur:  Abd: soft  Ext: No edema  neuro: Awake  Psych: flat affect  Skin: normal      LABS/DATA:    CARDIAC MARKERS:                                8.4    8.51  )-----------( 346      ( 28 Dec 2018 02:30 )             27.1     12-28    138  |  101  |  16  ----------------------------<  104<H>  3.6   |  25  |  0.55    Ca    8.6      28 Dec 2018 02:30  Phos  2.6     12-28  Mg     1.7     12-28      proBNP:   Lipid Profile:   HgA1c:   TSH:     TELE:  EKG:

## 2018-12-28 NOTE — DISCHARGE NOTE ADULT - ADDITIONAL INSTRUCTIONS
weight bear as tolerated   resume same diet as prior to surgery   DR Ventura 1 week call for appt [phone]

## 2018-12-28 NOTE — PROGRESS NOTE ADULT - ASSESSMENT
89yo F with dementia, Afib on xarelto s/p PPMpresents with unwitnessed fall question of syncope, Rt femur fx - s/p ORIF on 12/18 complicated by post-op leukocytosis, anemia, Afib with RVR, acute encephalopathy.

## 2018-12-28 NOTE — PROGRESS NOTE ADULT - PROBLEM SELECTOR PLAN 3
Anticipate patient to be high risk for post-operative delirium.  Avoid benzodiazepine, anticholinergics.  Minimize use of opioids.  Continue Remeron.
Very high risk for post-op delirium. Avoid benzodiazepine, anticholinergics.  Minimize use of opioids.    - Monitor QTc
s/p ORIF on 12/18. Pain control with morphine IV PRN. On Xarelto for DVT prophylaxis.  Surgical site management as per ortho.
s/p ORIF on 12/18. Pain control with morphine IV PRN. Xarelto for DVT prophylaxis.  Surgical site management as per ortho.
s/p ORIF on 12/18. Pain control with morphine IV PRN. On Xarelto for DVT prophylaxis.  Surgical site management as per ortho.

## 2018-12-28 NOTE — PROGRESS NOTE ADULT - ASSESSMENT
89F Afib on xarelto s/p PPM, dementia presents with unwitnessed fall - suspected syncope, Rt femur fx - s/p ORIF on 12/18 complicated by post-op leukocytosis, anemia, Afib with RVR, acute encephalopathy. Admitted to SICU with Acute Hypoxic Respiratory Failure with Afib with RVR.     Neuro  -history of seizures, was on keppra, now withheld given it was likely an episode of syncope resulting from afib? Holding Remeron given a decline in mental status  -tylenol prn for pain control   -consider post-op delerium vs. metabolic encephalopthy in a setting of sepsis 2/2 asp pna    Resp  -saturating >90% on non rebreather, wean as tolerated  -CXR displayed trace bilateral pleural effusions  -revisit plans with daughter for possible DNI status  -CT angio chest for possible Asp PNA, r/o PE - negative     Cardiac  -will transition from cardizem gtt to PO for HR goal <110  -continue digoxin  -will d/c lopressor  -EKG today  -vitals stable     GI  -NPO, will transition to puree diet  -bowel regimen of senna and colace    Renal  -bladder scan at bedside did not show signs of retention  -maintenance fluids at 50ml/hr    Heme  -cont xarelto for Afib; will f/u w/ Cardiology for potential d/c    MSK  -PT consult  -OOB to chair     ID  -elevated WBC attributed to reactive leukocytosis   -d/c zosyn, vanco  -prelim urine culture, blood cx demonstrate no growth to date    Dispo:   Critical care diagnoses: afib w/ RVR, dementia, femoral fracture 89F Afib on xarelto s/p PPM, dementia presents with unwitnessed fall - suspected syncope, Rt femur fx - s/p ORIF on 12/18 complicated by post-op leukocytosis, anemia, Afib with RVR, acute encephalopathy. Admitted to SICU with Acute Hypoxic Respiratory Failure with Afib with RVR.     Neuro  -history of seizures, was on keppra, now withheld given it was likely an episode of syncope resulting from afib? Holding Remeron given a decline in mental status  -tylenol prn for pain control   -consider post-op delerium vs. metabolic encephalopthy in a setting of sepsis 2/2 asp pna    Resp  -saturating >90% on room   air , wean as tolerated  -CXR displayed trace bilateral pleural effusions  -CT angio chest for possible Asp PNA, r/o PE - negative    Cardiac  - On Cardizem  PO   -continue digoxin  -lopressor discontinued   -vitals stable     GI  -Soft Diet   -bowel regimen of senna and colace    Renal  -replete lytes as needed   -maintenance fluids at 50ml/hr    Heme  - off Xeralto    MSK  -PT consult  -OOB to chair as tolerated     ID  -elevated WBC attributed to reactive leukocytosis   - off  Anti biotics   -prelim urine culture, blood cx demonstrate NGTD     Dispo:   Critical care diagnoses: afib w/ RVR, dementia, femoral fracture

## 2018-12-28 NOTE — DISCHARGE NOTE ADULT - PLAN OF CARE
fixation of fracture weight bear as tolerated   resume same diet as prior to surgery   Dr Ventura 1 week call for appt [phone] rate control continue current meds  repeat digoxin level 1 week   Private cardiologist vs Dr Joseph

## 2018-12-28 NOTE — PROGRESS NOTE ADULT - PROBLEM SELECTOR PROBLEM 2
Closed nondisplaced intertrochanteric fracture of right femur, initial encounter
Dementia without behavioral disturbance, unspecified dementia type
Persistent atrial fibrillation
Persistent atrial fibrillation
Dementia without behavioral disturbance, unspecified dementia type

## 2018-12-28 NOTE — PROGRESS NOTE ADULT - SUBJECTIVE AND OBJECTIVE BOX
Orthopedic Surgery Progress Note  No acute events overnight.  Transferred to floor this AM. Pain well controlled.     O:  Vital Signs Last 24 Hrs  T(C): 37.1 (28 Dec 2018 04:00), Max: 37.2 (27 Dec 2018 12:00)  T(F): 98.8 (28 Dec 2018 04:00), Max: 99 (27 Dec 2018 20:00)  HR: 90 (28 Dec 2018 04:00) (82 - 104)  BP: 119/55 (28 Dec 2018 05:00) (83/64 - 137/50)  BP(mean): 81 (28 Dec 2018 04:00) (52 - 81)  RR: 20 (28 Dec 2018 05:00) (16 - 27)  SpO2: 96% (28 Dec 2018 05:00) (93% - 100%)    Gen: NAD  RLE  incisions c/d/i  EHL/FHL/TA/GS intact  SILT DP/SP/GRIFFIN/Sa  WWP distally    Labs:                        8.4    8.51  )-----------( 346      ( 28 Dec 2018 02:30 )             27.1                         7.4    8.90  )-----------( 315      ( 27 Dec 2018 03:30 )             25.4     12-28    138  |  101  |  16  ----------------------------<  104<H>  3.6   |  25  |  0.55    A/P 89y year old female POD10 s/p Intramedullary insertion of intertrochanteric antegrade nail into hip    Pain Control  DVT PPX  PT/OOB  WBAT   rate control per cards  Dispo Planning    Vinny Rubi MD

## 2018-12-30 LAB
BACTERIA BLD CULT: SIGNIFICANT CHANGE UP
BACTERIA BLD CULT: SIGNIFICANT CHANGE UP

## 2019-01-09 ENCOUNTER — APPOINTMENT (OUTPATIENT)
Dept: ORTHOPEDIC SURGERY | Facility: CLINIC | Age: 84
End: 2019-01-09
Payer: COMMERCIAL

## 2019-01-09 DIAGNOSIS — S72.141D DISPLACED INTERTROCHANTERIC FRACTURE OF RIGHT FEMUR, SUBSEQUENT ENCOUNTER FOR CLOSED FRACTURE WITH ROUTINE HEALING: ICD-10-CM

## 2019-01-09 DIAGNOSIS — S72.141P: ICD-10-CM

## 2019-01-09 PROCEDURE — 99024 POSTOP FOLLOW-UP VISIT: CPT

## 2019-01-11 PROBLEM — S72.141D CLOSED DISPLACED INTERTROCHANTERIC FRACTURE OF RIGHT FEMUR WITH ROUTINE HEALING, SUBSEQUENT ENCOUNTER: Status: ACTIVE | Noted: 2019-01-09

## 2019-01-11 NOTE — HISTORY OF PRESENT ILLNESS
[Doing Well] : is doing well [de-identified] : S/P Im fixation right IT fracture 12/18/18  [de-identified] : 89 yo F demential S/p Im fixation Rt IT fracture presents for first post op followup doing well have developed vomiting today.  [de-identified] : Physical exam of the Right Hip: There is a well healed surgical incision with no erythema or drainage with Crump in place. There are no signs of infection and normal post operative edema with resolving ecchymosis. They have 100 degrees  of hip flexion,  20 degrees Abduction, 0 degrees adduction 20 degrees external rotation, 20 degrees internal rotation. They has -4/5 strength  with resistive flexion, extension and abduction.here is a normal neurovascular exam with 2+ distal pulses\par  [de-identified] : S/P Im fixation right IT fracture 12/18/18 doing well  [de-identified] : S/P Im fixation right IT fracture 12/18/18 \par WBAT \par Staples removed \par - F/U in 6 weeks

## 2019-02-20 ENCOUNTER — APPOINTMENT (OUTPATIENT)
Dept: ORTHOPEDIC SURGERY | Facility: CLINIC | Age: 84
End: 2019-02-20

## 2022-11-08 NOTE — ED ADULT TRIAGE NOTE - AS TEMP SITE
Detail Level: Detailed Products Recommended: [employer]\\nPhysical sunscreen w/ zinc or titanium oxide component\\nWide brim Hat\\nUV protective clothing General Sunscreen Counseling: I recommended a broad spectrum sunscreen with a SPF of 30 or higher.  I explained that SPF 30 sunscreens block approximately 97 percent of the sun's harmful rays.  Sunscreens should be applied at least 15 minutes prior to expected sun exposure and then every 2 hours after that as long as sun exposure continues. If swimming or exercising sunscreen should be reapplied every 45 minutes to an hour after getting wet or sweating.  One ounce, or the equivalent of a shot glass full of sunscreen, is adequate to protect the skin not covered by a bathing suit. I also recommended a lip balm with a sunscreen as well. Sun protective clothing can be used in lieu of sunscreen but must be worn the entire time you are exposed to the sun's rays. oral

## 2024-07-23 NOTE — DIETITIAN INITIAL EVALUATION ADULT. - 25 CAL
choose whole fruit rather than fruit juice.  Eat a variety of colors.        Vegetables   These can be fresh, frozen, canned, or dried.  Beans, peas, and lentils count too.        Whole grains   Choose whole-grain breads, cereals, and noodles.  Try brown rice.        Lean proteins   These can include lean meat, poultry, fish, and eggs.  You can also have tofu, beans, peas, lentils, nuts, and seeds.        Dairy   Try milk, yogurt, and cheese.  Choose low-fat or fat-free when you can.  If you need to, use lactose-free milk or fortified plant-based milk products, such as soy milk.        Water   Drink water when you're thirsty.  Limit sugar-sweetened drinks, including soda, fruit drinks, and sports drinks.  Where can you learn more?  Go to https://www.[employer].net/patientEd and enter T756 to learn more about \"Eating Healthy Foods: Care Instructions.\"  Current as of: September 20, 2023  Content Version: 14.1  © 2006-2024 [employer].   Care instructions adapted under license by [employer]. If you have questions about a medical condition or this instruction, always ask your healthcare professional. [employer] disclaims any warranty or liability for your use of this information.           A Healthy Heart: Care Instructions  Overview     Coronary artery disease, also called heart disease, occurs when a substance called plaque builds up in the vessels that supply oxygen-rich blood to your heart muscle. This can narrow the blood vessels and reduce blood flow. A heart attack happens when blood flow is completely blocked. A high-fat diet, smoking, and other factors increase the risk of heart disease.  Your doctor has found that you have a chance of having heart disease. A heart-healthy lifestyle can help keep your heart healthy and prevent heart disease. This lifestyle includes eating healthy, being active, staying at a weight that's healthy for you, and not smoking or using tobacco. It also  [id number]
